# Patient Record
Sex: FEMALE | Race: WHITE | NOT HISPANIC OR LATINO | Employment: UNEMPLOYED | URBAN - METROPOLITAN AREA
[De-identification: names, ages, dates, MRNs, and addresses within clinical notes are randomized per-mention and may not be internally consistent; named-entity substitution may affect disease eponyms.]

---

## 2017-11-27 ENCOUNTER — APPOINTMENT (OUTPATIENT)
Dept: ADMISSIONS | Facility: MEDICAL CENTER | Age: 57
End: 2017-11-27
Attending: SPECIALIST
Payer: COMMERCIAL

## 2017-11-27 RX ORDER — NICOTINE 21 MG/24HR
1 PATCH, TRANSDERMAL 24 HOURS TRANSDERMAL EVERY 24 HOURS
COMMUNITY
End: 2023-05-09

## 2017-11-27 RX ORDER — LEVOTHYROXINE SODIUM 175 UG/1
175 TABLET ORAL
COMMUNITY
End: 2023-05-09

## 2017-11-27 NOTE — OR NURSING
"Preadmit appointment: \" Preparing for your Procedure information\" sheet given to patient with verbal and written instructions. Patient instructed to continue prescribed medications through the day before surgery, instructed to take the following medications the day of surgery per anesthesia protocol:Xanax if needed, Hydrocodone or Tramadol if needed, Levothyroxine  "

## 2017-12-01 ENCOUNTER — HOSPITAL ENCOUNTER (OUTPATIENT)
Facility: MEDICAL CENTER | Age: 57
End: 2017-12-01
Attending: SPECIALIST | Admitting: SPECIALIST
Payer: COMMERCIAL

## 2017-12-01 VITALS
SYSTOLIC BLOOD PRESSURE: 134 MMHG | RESPIRATION RATE: 12 BRPM | BODY MASS INDEX: 26.68 KG/M2 | HEART RATE: 92 BPM | TEMPERATURE: 97.5 F | WEIGHT: 141.31 LBS | OXYGEN SATURATION: 92 % | DIASTOLIC BLOOD PRESSURE: 89 MMHG | HEIGHT: 61 IN

## 2017-12-01 PROBLEM — Z41.1 ENCOUNTER FOR COSMETIC SURGERY: Status: ACTIVE | Noted: 2017-12-01

## 2017-12-01 PROCEDURE — 160025 RECOVERY II MINUTES (STATS): Performed by: SPECIALIST

## 2017-12-01 PROCEDURE — 700105 HCHG RX REV CODE 258: Performed by: SPECIALIST

## 2017-12-01 PROCEDURE — 501838 HCHG SUTURE GENERAL: Performed by: SPECIALIST

## 2017-12-01 PROCEDURE — 160028 HCHG SURGERY MINUTES - 1ST 30 MINS LEVEL 3: Performed by: SPECIALIST

## 2017-12-01 PROCEDURE — 502575 HCHG PACK, BREAST: Performed by: SPECIALIST

## 2017-12-01 PROCEDURE — 700111 HCHG RX REV CODE 636 W/ 250 OVERRIDE (IP)

## 2017-12-01 PROCEDURE — A9270 NON-COVERED ITEM OR SERVICE: HCPCS

## 2017-12-01 PROCEDURE — 160009 HCHG ANES TIME/MIN: Performed by: SPECIALIST

## 2017-12-01 PROCEDURE — 160035 HCHG PACU - 1ST 60 MINS PHASE I: Performed by: SPECIALIST

## 2017-12-01 PROCEDURE — 700101 HCHG RX REV CODE 250

## 2017-12-01 PROCEDURE — 160036 HCHG PACU - EA ADDL 30 MINS PHASE I: Performed by: SPECIALIST

## 2017-12-01 PROCEDURE — 160046 HCHG PACU - 1ST 60 MINS PHASE II: Performed by: SPECIALIST

## 2017-12-01 PROCEDURE — 160002 HCHG RECOVERY MINUTES (STAT): Performed by: SPECIALIST

## 2017-12-01 PROCEDURE — 160039 HCHG SURGERY MINUTES - EA ADDL 1 MIN LEVEL 3: Performed by: SPECIALIST

## 2017-12-01 PROCEDURE — 160048 HCHG OR STATISTICAL LEVEL 1-5: Performed by: SPECIALIST

## 2017-12-01 PROCEDURE — 700102 HCHG RX REV CODE 250 W/ 637 OVERRIDE(OP)

## 2017-12-01 PROCEDURE — 500122 HCHG BOVIE, BLADE: Performed by: SPECIALIST

## 2017-12-01 RX ORDER — LIDOCAINE HYDROCHLORIDE 10 MG/ML
INJECTION, SOLUTION INFILTRATION; PERINEURAL
Status: COMPLETED
Start: 2017-12-01 | End: 2017-12-01

## 2017-12-01 RX ORDER — SODIUM CHLORIDE, SODIUM LACTATE, POTASSIUM CHLORIDE, CALCIUM CHLORIDE 600; 310; 30; 20 MG/100ML; MG/100ML; MG/100ML; MG/100ML
INJECTION, SOLUTION INTRAVENOUS
Status: DISCONTINUED | OUTPATIENT
Start: 2017-12-01 | End: 2017-12-01 | Stop reason: HOSPADM

## 2017-12-01 RX ORDER — OXYCODONE HCL 5 MG/5 ML
SOLUTION, ORAL ORAL
Status: COMPLETED
Start: 2017-12-01 | End: 2017-12-01

## 2017-12-01 RX ADMIN — HYDROMORPHONE HYDROCHLORIDE 0.2 MG: 1 INJECTION, SOLUTION INTRAMUSCULAR; INTRAVENOUS; SUBCUTANEOUS at 12:51

## 2017-12-01 RX ADMIN — FENTANYL CITRATE 50 MCG: 50 INJECTION, SOLUTION INTRAMUSCULAR; INTRAVENOUS at 12:25

## 2017-12-01 RX ADMIN — OXYCODONE HYDROCHLORIDE 10 MG: 5 SOLUTION ORAL at 12:25

## 2017-12-01 RX ADMIN — SODIUM CHLORIDE, POTASSIUM CHLORIDE, SODIUM LACTATE AND CALCIUM CHLORIDE: 600; 310; 30; 20 INJECTION, SOLUTION INTRAVENOUS at 08:51

## 2017-12-01 RX ADMIN — FENTANYL CITRATE 50 MCG: 50 INJECTION, SOLUTION INTRAMUSCULAR; INTRAVENOUS at 12:35

## 2017-12-01 RX ADMIN — HYDROMORPHONE HYDROCHLORIDE 0.2 MG: 1 INJECTION, SOLUTION INTRAMUSCULAR; INTRAVENOUS; SUBCUTANEOUS at 13:07

## 2017-12-01 RX ADMIN — LIDOCAINE HYDROCHLORIDE 0.2 ML: 10 INJECTION, SOLUTION INFILTRATION; PERINEURAL at 08:45

## 2017-12-01 ASSESSMENT — PAIN SCALES - GENERAL
PAINLEVEL_OUTOF10: 5
PAINLEVEL_OUTOF10: 0
PAINLEVEL_OUTOF10: 7
PAINLEVEL_OUTOF10: 8
PAINLEVEL_OUTOF10: 7
PAINLEVEL_OUTOF10: 5
PAINLEVEL_OUTOF10: 6
PAINLEVEL_OUTOF10: 5

## 2017-12-01 NOTE — OR NURSING
1312: Rcvd report from LAURENCE Galo and assumed care, Pt sitting up in bed, pain is tolerable, no nausea, trial with room air started. Able to pull 1250 on IS, but goal is 2500 ml.  1320: More awake, asking if ride is here yet. Checked with Surgery check in and pt's boyfriend is here, just stepped out to get lunch at the cafeteria. Now able to pull 1500 ml on IS.  1335: Still sleepy, arouses to voice and spontaneously, but when sleeping, O2 saturations drop as low as 74-77%. Will come back up to 81-83% while still asleep, but will come back to >90% when aroused and asked to take a deep breath. Saturations are 92-97% after using IS.  1345: Report given back to LAURENCE Galo and she reassumed care. Pain is still tolerable, no nausea. O2 saturations are improving.

## 2017-12-01 NOTE — OR NURSING
1203 To PACU from OR via gurney, sleeping, respirations spontaneous and non-labored via OPA. VSS. Dressing to bilateral breasts c/d/i   1210 OPA dc'd at this time.  Pt drowsy but responds to verbal stimuli. VSS   1225 Pt c/o 8/10 pain. Plan to medicate at this time. VSS. Pt alert and oriented at this time.   1240 Pt states pain us coming down at this time. VSS   1250 Pt states she is having 7/10 pain. Plan to medicate. See MAR. VSS   1310 Gave report to LAURENCE Sanchez who assumed care of pt   1345 Received report from Laura, pt states pain is tolerable. Pt   1400 Pt meets criteria for stage two at this time.

## 2017-12-01 NOTE — OR SURGEON
Immediate Post OP Note    PreOp Diagnosis: ptosis    PostOp Diagnosis: same    Procedure(s):  FULL MASTOPEXY - Wound Class: Clean    Surgeon(s):  Kenji Joiner M.D.    Anesthesiologist/Type of Anesthesia:  Anesthesiologist: Lazaro Pereyra M.D.  Anesthesia Technician: Amira Lozoya/General    Surgical Staff:  Circulator: Corrine Kelsey R.N.  Relief Circulator: Trang Cheng R.N.  Scrub Person: Luz Fagan    Specimens:  * No specimens in log *    Estimated Blood Loss: 75cc    Findings:     Complications: none        12/1/2017 12:04 PM Kenji Joiner

## 2017-12-01 NOTE — OP REPORT
DATE OF SERVICE:  12/01/2017    PREOPERATIVE DIAGNOSIS:  Bilateral breast ptosis.    POSTOPERATIVE DIAGNOSIS:  Bilateral breast ptosis.    OPERATIVE PROCEDURE:  Bilateral full mastopexy.    SURGEON:  Kenji Joiner MD.    ANESTHESIOLOGIST:  Lazaro Pereyra MD.    ANESTHESIA:  General endotracheal tube.    COMPLICATIONS:  None.    ESTIMATED BLOOD LOSS:  Less than 75 mL    INDICATIONS:  Patient is a 57-year-old female who is here for bilateral full   mastopexy.  Risks and benefits of the procedure have been explained in full   including nerve damage, sensation loss, scar formation, loss of nipple areolar   complex, hematoma, seroma, wound dehiscence.  Patient is aware of the risks   and wished to proceed.    FINDINGS:  As above.    DESCRIPTION OF PROCEDURE:  The patient was preoperatively marked in the   holding room in standing position.  She was taken to operating theater where   general anesthesia was induced and 2 grams of Ancef was given prior to   starting the procedure.  Starting on the right side, I used a 38 mm template   around the nipple areolar complex and then deepithelialized, marked predrawn   marks which was a standard wise type pattern.  Once that was completed, I   developed a superior and inferior pedicle for the nipple areolar complex,   dissected medial and lateral to develop the medial and lateral flaps.    Hemostasis was obtained with electrocautery.  The pocket was irrigated with   normal saline solution.  Once that was completed, I brought the medial and   lateral flaps across the midline coming over the top of the inferior pedicle,   sutured them in place with 2-0 Vicryl sutures in the deep dermal layer.  The   nipple areolar complex was elevated into the keyhole incision and sutured in   place with 3-0 Vicryl sutures around the areola again in the deep dermal   position.  Once I had partially closed the right breast, went over to the left   side, used a 38 mm template again and did a similar  dissection with   de-epithelializing the wise type pattern, developing the superior and inferior   pedicle and then developing the medial and lateral flaps that were brought   around and sutured in place with 2-0 and 3-0 Vicryl sutures.  The patient   looked very symmetrical.  Once we were satisfied with the symmetry, we closed   the skin using 3-0 Stratafix subcuticular stitch even around the nipple   areolar complex and down the vertical incision.  A 2-0 Statafix suture was   used on the horizontal incision and of course this was a subcuticular stitch   also.  Once I was done with both the right and left sides, Steri-Strips were   placed on the incision.  She was placed in a comfort supportive bra with ABD   padding.  I wrapped her with a sure band Ace wrap for light compression.       ____________________________________     MD BREANA ROA / MAURI    DD:  12/01/2017 12:24:07  DT:  12/01/2017 13:28:46    D#:  [id number]  Job#:  [id number]

## 2017-12-01 NOTE — DISCHARGE INSTRUCTIONS
ACTIVITY: Rest and take it easy for the first 24 hours.  A responsible adult is recommended to remain with you during that time.  It is normal to feel sleepy.  We encourage you to not do anything that requires balance, judgment or coordination.    MILD FLU-LIKE SYMPTOMS ARE NORMAL. YOU MAY EXPERIENCE GENERALIZED MUSCLE ACHES, THROAT IRRITATION, HEADACHE AND/OR SOME NAUSEA.    FOR 24 HOURS DO NOT:  Drive, operate machinery or run household appliances.  Drink beer or alcoholic beverages.   Make important decisions or sign legal documents.    SPECIAL INSTRUCTIONS:  Keep dressing clean, dry, and intact.  Leave ACE wrap on for 48 hours.  Sleep/rest with head elevated at least 30 degrees (ie well propped up with pillows in bed or in recliner chair)   Begin antibiotic tomorrow 12/2/17. May shower in 48 hours     DIET: To avoid nausea, slowly advance diet as tolerated, avoiding spicy or greasy foods for the first day.  Add more substantial food to your diet according to your physician's instructions. INCREASE FLUIDS AND FIBER TO AVOID CONSTIPATION.    FOLLOW-UP APPOINTMENT:  A follow-up appointment should be arranged with your doctor ; call to schedule.    You should CALL YOUR PHYSICIAN if you develop:  Fever greater than 101 degrees F.  Pain not relieved by medication, or persistent nausea or vomiting.  Excessive bleeding (blood soaking through dressing) or unexpected drainage from the wound.  Extreme redness or swelling around the incision site, drainage of pus or foul smelling drainage.  Inability to urinate or empty your bladder within 8 hours.  Problems with breathing or chest pain.    You should call 911 if you develop problems with breathing or chest pain.  If you are unable to contact your doctor or surgical center, you should go to the nearest emergency room or urgent care center.  Physician's telephone #: Dr. Joiner [phone]    If any questions arise, call your doctor.  If your doctor is not available, please  feel free to call the Surgical Center at [phone].  The Center is open Monday through Friday from 7AM to 7PM.  You can also call the HEALTH HOTLINE open 24 hours/day, 7 days/week and speak to a nurse at [phone], or toll free at [phone].    A registered nurse may call you a few days after your surgery to see how you are doing after your procedure.    MEDICATIONS: Resume taking daily medication.  Take prescribed pain medication with food.  If no medication is prescribed, you may take non-aspirin pain medication if needed.  PAIN MEDICATION CAN BE VERY CONSTIPATING.  Take a stool softener or laxative such as senokot, pericolace, or milk of magnesia if needed.    Prescription given for Preoperatively .  Last pain medication given at 12:25 (10 mg oxycodone) .    If your physician has prescribed pain medication that includes Acetaminophen (Tylenol), do not take additional Acetaminophen (Tylenol) while taking the prescribed medication.    Depression / Suicide Risk    As you are discharged from this [facility] facility, it is important to learn how to keep safe from harming yourself.    Recognize the warning signs:  · Abrupt changes in personality, positive or negative- including increase in energy   · Giving away possessions  · Change in eating patterns- significant weight changes-  positive or negative  · Change in sleeping patterns- unable to sleep or sleeping all the time   · Unwillingness or inability to communicate  · Depression  · Unusual sadness, discouragement and loneliness  · Talk of wanting to die  · Neglect of personal appearance   · Rebelliousness- reckless behavior  · Withdrawal from people/activities they love  · Confusion- inability to concentrate     If you or a loved one observes any of these behaviors or has concerns about self-harm, here's what you can do:  · Talk about it- your feelings and reasons for harming yourself  · Remove any means that you might use to hurt yourself  (examples: pills, rope, extension cords, firearm)  · Get professional help from the community (Mental Health, Substance Abuse, psychological counseling)  · Do not be alone:Call your Safe Contact- someone whom you trust who will be there for you.  · Call your local CRISIS HOTLINE 565-0519 or 741-199-3195  · Call your local Children's Mobile Crisis Response Team [place] (409) 232-4896 or www.ishBowl  · Call the toll free National Suicide Prevention Hotlines   · National Suicide Prevention Lifeline 639-531-HLWD (4739)  · National Hope Line Network 800-SUICIDE (993-2744)

## 2017-12-01 NOTE — OR NURSING
1412: Settled in recliner post short ambulation from gurney to restroom and then to chair. Pain is tolerable, no nausea, dressing is CDI. Oxygen saturation is >90%, even while walking.  1442: O2 saturations remain 92-97% on room air. Pt not feeling SOB, dizzy, pain is still tolerable, and no nausea. D/Bruce to care of family stay in PACU 2.

## 2017-12-01 NOTE — OR NURSING
0727 pt to pre op to assume care.  0849 Patient allergies and NPO status verified, home medication reconciliation completed and belongings secured. Patient verbalizes understanding of pain scale, expected course of stay and plan of care. Surgical site verified with patient. IV access established. Sequentials placed at bedside.

## 2018-02-05 ENCOUNTER — APPOINTMENT (OUTPATIENT)
Dept: ADMISSIONS | Facility: MEDICAL CENTER | Age: 58
End: 2018-02-05
Attending: SPECIALIST
Payer: COMMERCIAL

## 2018-02-05 NOTE — OR NURSING
PreAdmit Appointment: Patient given Preparing for your procedure handout. Patient instructed to continue regularly prescribed medications through day before surgery. Instructed to take the following medications the day of surgery with a sip of water per Anesthesia protocol: Levothyroxine, Norco if needed, Xanax if needed.

## 2018-02-09 ENCOUNTER — HOSPITAL ENCOUNTER (OUTPATIENT)
Facility: MEDICAL CENTER | Age: 58
End: 2018-02-09
Attending: SPECIALIST | Admitting: SPECIALIST
Payer: COMMERCIAL

## 2018-02-09 VITALS
TEMPERATURE: 97.7 F | HEIGHT: 61 IN | BODY MASS INDEX: 27.19 KG/M2 | WEIGHT: 144 LBS | SYSTOLIC BLOOD PRESSURE: 163 MMHG | RESPIRATION RATE: 16 BRPM | OXYGEN SATURATION: 93 % | DIASTOLIC BLOOD PRESSURE: 91 MMHG

## 2018-02-09 PROBLEM — Z41.1 ENCOUNTER FOR COSMETIC PROCEDURE: Status: ACTIVE | Noted: 2018-02-09

## 2018-02-09 PROCEDURE — 160036 HCHG PACU - EA ADDL 30 MINS PHASE I: Performed by: SPECIALIST

## 2018-02-09 PROCEDURE — 500817 HCHG MAMMARY SIZER: Performed by: SPECIALIST

## 2018-02-09 PROCEDURE — 160009 HCHG ANES TIME/MIN: Performed by: SPECIALIST

## 2018-02-09 PROCEDURE — 700111 HCHG RX REV CODE 636 W/ 250 OVERRIDE (IP)

## 2018-02-09 PROCEDURE — 160025 RECOVERY II MINUTES (STATS): Performed by: SPECIALIST

## 2018-02-09 PROCEDURE — 160048 HCHG OR STATISTICAL LEVEL 1-5: Performed by: SPECIALIST

## 2018-02-09 PROCEDURE — 501445 HCHG STAPLER, SKIN DISP: Performed by: SPECIALIST

## 2018-02-09 PROCEDURE — 700102 HCHG RX REV CODE 250 W/ 637 OVERRIDE(OP)

## 2018-02-09 PROCEDURE — 160039 HCHG SURGERY MINUTES - EA ADDL 1 MIN LEVEL 3: Performed by: SPECIALIST

## 2018-02-09 PROCEDURE — 160002 HCHG RECOVERY MINUTES (STAT): Performed by: SPECIALIST

## 2018-02-09 PROCEDURE — 502575 HCHG PACK, BREAST: Performed by: SPECIALIST

## 2018-02-09 PROCEDURE — 160035 HCHG PACU - 1ST 60 MINS PHASE I: Performed by: SPECIALIST

## 2018-02-09 PROCEDURE — 501838 HCHG SUTURE GENERAL: Performed by: SPECIALIST

## 2018-02-09 PROCEDURE — A9270 NON-COVERED ITEM OR SERVICE: HCPCS

## 2018-02-09 PROCEDURE — 700101 HCHG RX REV CODE 250

## 2018-02-09 PROCEDURE — 500440 HCHG DRESSING, STERILE ROLL (KERLIX): Performed by: SPECIALIST

## 2018-02-09 PROCEDURE — A6410 STERILE EYE PAD: HCPCS | Performed by: SPECIALIST

## 2018-02-09 PROCEDURE — 700105 HCHG RX REV CODE 258: Performed by: SPECIALIST

## 2018-02-09 PROCEDURE — 160046 HCHG PACU - 1ST 60 MINS PHASE II: Performed by: SPECIALIST

## 2018-02-09 PROCEDURE — 160028 HCHG SURGERY MINUTES - 1ST 30 MINS LEVEL 3: Performed by: SPECIALIST

## 2018-02-09 PROCEDURE — 500128 HCHG BOVIE, NEEDLE TIP 3CM: Performed by: SPECIALIST

## 2018-02-09 PROCEDURE — 500122 HCHG BOVIE, BLADE: Performed by: SPECIALIST

## 2018-02-09 DEVICE — IMPLANTABLE DEVICE: Type: IMPLANTABLE DEVICE | Site: BREAST | Status: FUNCTIONAL

## 2018-02-09 RX ORDER — BUPIVACAINE HYDROCHLORIDE AND EPINEPHRINE 2.5; 5 MG/ML; UG/ML
INJECTION, SOLUTION EPIDURAL; INFILTRATION; INTRACAUDAL; PERINEURAL
Status: DISCONTINUED | OUTPATIENT
Start: 2018-02-09 | End: 2018-02-09 | Stop reason: HOSPADM

## 2018-02-09 RX ORDER — GENTAMICIN SULFATE 40 MG/ML
INJECTION, SOLUTION INTRAMUSCULAR; INTRAVENOUS
Status: DISCONTINUED | OUTPATIENT
Start: 2018-02-09 | End: 2018-02-09 | Stop reason: HOSPADM

## 2018-02-09 RX ORDER — BACITRACIN 50000 [IU]/1
INJECTION, POWDER, FOR SOLUTION INTRAMUSCULAR
Status: DISCONTINUED | OUTPATIENT
Start: 2018-02-09 | End: 2018-02-09 | Stop reason: HOSPADM

## 2018-02-09 RX ORDER — HYDROMORPHONE HYDROCHLORIDE 2 MG/ML
INJECTION, SOLUTION INTRAMUSCULAR; INTRAVENOUS; SUBCUTANEOUS
Status: COMPLETED
Start: 2018-02-09 | End: 2018-02-09

## 2018-02-09 RX ORDER — OXYCODONE HCL 5 MG/5 ML
SOLUTION, ORAL ORAL
Status: COMPLETED
Start: 2018-02-09 | End: 2018-02-09

## 2018-02-09 RX ORDER — LIDOCAINE HYDROCHLORIDE AND EPINEPHRINE 10; 10 MG/ML; UG/ML
INJECTION, SOLUTION INFILTRATION; PERINEURAL
Status: DISCONTINUED | OUTPATIENT
Start: 2018-02-09 | End: 2018-02-09 | Stop reason: HOSPADM

## 2018-02-09 RX ORDER — SODIUM CHLORIDE, SODIUM LACTATE, POTASSIUM CHLORIDE, CALCIUM CHLORIDE 600; 310; 30; 20 MG/100ML; MG/100ML; MG/100ML; MG/100ML
INJECTION, SOLUTION INTRAVENOUS CONTINUOUS
Status: DISCONTINUED | OUTPATIENT
Start: 2018-02-09 | End: 2018-02-12 | Stop reason: HOSPADM

## 2018-02-09 RX ADMIN — SODIUM CHLORIDE, POTASSIUM CHLORIDE, SODIUM LACTATE AND CALCIUM CHLORIDE: 600; 310; 30; 20 INJECTION, SOLUTION INTRAVENOUS at 11:05

## 2018-02-09 RX ADMIN — HYDROMORPHONE HYDROCHLORIDE 0.5 MG: 2 INJECTION, SOLUTION INTRAMUSCULAR; INTRAVENOUS; SUBCUTANEOUS at 16:14

## 2018-02-09 RX ADMIN — FENTANYL CITRATE 50 MCG: 50 INJECTION, SOLUTION INTRAMUSCULAR; INTRAVENOUS at 15:46

## 2018-02-09 RX ADMIN — OXYCODONE HYDROCHLORIDE 10 MG: 5 SOLUTION ORAL at 15:40

## 2018-02-09 RX ADMIN — FENTANYL CITRATE 50 MCG: 50 INJECTION, SOLUTION INTRAMUSCULAR; INTRAVENOUS at 15:30

## 2018-02-09 ASSESSMENT — PAIN SCALES - GENERAL
PAINLEVEL_OUTOF10: 8
PAINLEVEL_OUTOF10: 0
PAINLEVEL_OUTOF10: 8
PAINLEVEL_OUTOF10: 6
PAINLEVEL_OUTOF10: 5
PAINLEVEL_OUTOF10: 8
PAINLEVEL_OUTOF10: 6
PAINLEVEL_OUTOF10: 8
PAINLEVEL_OUTOF10: 6
PAINLEVEL_OUTOF10: 6

## 2018-02-09 NOTE — OR NURSING
"1513 Patient to recovery via cart. Placed on monitors. Oral airway remains in place. Spontaneous respirations. Lung sounds clear.   1515 Oral airway removed. Patient awake. States she has to \"pee\".   1520 Patient placed on bedpan.  1525 Patient complains of pain.  1530 Patient medicated intravenously for pain.  1540 Patent medicated orally for pain. Patient urinated a copious amount of urine. Sheets changed. Warm blankets given. Patient sipping on water. Ice pack applied to patients eyes.  1551 Patients friend notified of disposition.  1556 Report to Laura RN to assume care of patient.  "

## 2018-02-09 NOTE — OR NURSING
Pt to pre-op. Name, birthday, allergies, NPO status, last void, medication rec, surgical site and procedure verified with patient.  Pt belongings collected and secured in locker.  Pt verbalizes understanding of pain scale, expected plan of care and course of stay.  IV established. Sequentials placed.

## 2018-02-09 NOTE — OR SURGEON
Immediate Post OP Note    PreOp Diagnosis: lower lid fat herniation, breast ptosis    PostOp Diagnosis: same    Procedure(s):  BLEPHAROPLASTY - LOWER - Wound Class: Clean  MAMMOPLASTY AUGMENTATION - Wound Class: Clean    Surgeon(s):  Kenji Joiner M.D.    Anesthesiologist/Type of Anesthesia:  Anesthesiologist: Lazaro Pereyra M.D./General    Surgical Staff:  Circulator: Milka Munroe R.N.  Relief Circulator: Trang Cheng R.N.  Scrub Person: Luz Fagan    Specimens:  * No specimens in log *    Estimated Blood Loss: 100cc    Findings:     Complications: none        2/9/2018 3:16 PM Kenji Joiner

## 2018-02-10 NOTE — OP REPORT
DATE OF SERVICE:  02/09/2018    PREOPERATIVE DIAGNOSES:  1.  Bilateral lower lid fat herniation (severe).  2.  Bilateral breast ptosis, status post full mastopexy, patient now here for   breast augmentation.    POSTOPERATIVE DIAGNOSES:  1.  Bilateral lower lid fat herniation (severe).  2.  Bilateral breast ptosis, status post full mastopexy, patient now here for   breast augmentation.    OPERATIVE PROCEDURE:  1.  Bilateral lower lid blepharoplasty.  2.  Bilateral breast augmentation (Allergan SRX gel implants 750 mL   bilateral).    SURGEON:  Kenji Joiner MD    ANESTHESIOLOGIST:  Dr. Pereyra.    ANESTHESIA:  General endotracheal tube.    COMPLICATIONS:  None.    ESTIMATED BLOOD LOSS:  100 mL    INDICATIONS:  The patient is a 56-year-old female who desires bilateral breast   augmentation and lower lid blepharoplasty.  Risks and benefits of the   procedure have been explained in full including infection, bleeding,   capsulation, deflation, asymmetries, nerve damage, rippling on sides, possible   decreased cancer detection.  In regards to lower lid blepharoplasty we   discussed the issues such as scar formation, ectropion information, scleral   show, retained wrinkles of the lower, over resection, under resection.  The   patient understands the risks and wished to proceed.    FINDINGS:  As above.    DETAILS OF PROCEDURE:  The patient was preoperatively marked in the holding   room in standing position.  I actually marked her eyes when she was sitting.    Once she was marked, the patient was taken to operating room.  General   anesthesia was induced.  A 2 g of Ancef were given prior to starting the   procedure.  Starting with the blepharoplasty, patient was prepped and draped   and I used 1% lidocaine with epinephrine in the lower lid for the hemostatic   effect, I made a subciliary incision with a 15 blade, standard hockey stick   type incision, dissected down through the tissue developing a skin muscle   flap,  identified the septal tissue with the herniated fat behind it, opened up   all 3 compartments, including the lateral, middle and medial fat pads.  They   were teased out and removed with electrocautery.  Electrocautery was used to   controlling any bleeding.  I had initially placed a temporary tarsorrhaphy   stitch that was taken out and then I resected approximately 1-2 mm of lower   lid skin and a triangle of skin out laterally, I was conservative on how much   skin I removed just because the patient has a moderate snap back test.  Once I   had completed removing the lower lid skin.  Cautery was used for hemostasis.    I used a 6-0 Vicryl suture and reapproximated the orbicularis muscle out   laterally for strength and then closed the skin with a 6-0 silk suture in   interrupted fashion, went over to the left side, I did a similar dissection   removing the medial, middle and lateral fat pads.  I did keep the fat to make   sure I remove the same amount on both sides.  Then, I resected 1-2 mm of lower   lid skin and resuspended the orbicularis muscle like I did on the right side.    Once her eyelids were closed, washed out her eyes with BSS solution and we   then undraped and reprepped for the breast augmentation.    The patient had already undergone a previous full mastopexy.  Once she was   prepped and draped, I used 10 mL of 0.25% Marcaine with epinephrine along the   lateral incision and in the pectoralis major muscle up high by the armpit.    Starting on the right side, I made an incision through her old lateral   incision, dissected down to the breast tissue, identified the lateral edge of   the pectoralis major muscle, elevated it, made a submuscular pocket.    Hemostasis was obtained with electrocautery.  The pocket was irrigated with   normal saline solution with triple antibiotic added.  I then placed a saline   sizer on this right side and filled it up to 750 mL  Pocket adjustments were   made.  Once I  was satisfied, went over to left side, did similar submuscular   dissection and placed another 750 mL saline sizer, the patient was sat up.  I   was pleased the symmetry.  Once I have done a little bit of pocket   adjustments, patient was laid down.  The saline implants were removed.  The   pocket was irrigated again and I injected 20 mL of 0.25% Marcaine with   epinephrine into the pectoralis major muscle for postoperative pain control.    We then chose to open the Allergan SRX gel implants.  There were 750 mL, I   placed an implant on the right side through a Ren funnel into the   submuscular position.  This was similarly done on the left side.  Once we had   both implants in the submuscular position and the patient was sat up once   again, nature of the symmetry was good, she was laid back down and I closed   the deep layers with 3-0 Vicryl sutures, close the skin with a 3-0 Stratafix   subcuticular stitch, double layer closure.  Steri-Strips were applied, 2-inch   foam tape was placed around the breasts and she was placed in a comfort   supportive bra.  I wrapped her with a sure band, Ace wrap for compression.    She was returned to the PACU in stable condition.       ____________________________________     MD BREANA ROA / MAURI    DD:  02/09/2018 18:27:44  DT:  02/09/2018 20:17:54    D#:  [id number]  Job#:  [id number]

## 2018-02-10 NOTE — OR NURSING
1555: Rcvd report from LAURENCE Matos and [facility] care. Pt resting, pain medications just given, no nausea.  1600: Sitting up in the gurney more, arms propped up on pillows. Pain is currently tolerable, no nausea.  1615: Pain increased, pain medication given per MAR, no nausea.  1630: Pain is tolerable, awake and alert, no nausea, going to trial room air. Able to pull 1000 ml on IS, goal is 1820 ml.  1640: Tolerating room air, pt more awake, able to pull 1250 ml on IS. Pain is tolerable, no nausea.  1645: Pain is still tolerable, no nausea, tolerating room air. Meets criteria to transfer to Stage 2, report called to LAURENCE Hayden and pt readied for transfer.

## 2018-02-10 NOTE — DISCHARGE INSTRUCTIONS
ACTIVITY: Rest and take it easy for the first 24 hours.  A responsible adult is recommended to remain with you during that time.  It is normal to feel sleepy.  We encourage you to not do anything that requires balance, judgment or coordination.    MILD FLU-LIKE SYMPTOMS ARE NORMAL. YOU MAY EXPERIENCE GENERALIZED MUSCLE ACHES, THROAT IRRITATION, HEADACHE AND/OR SOME NAUSEA.    FOR 24 HOURS DO NOT:  Drive, operate machinery or run household appliances.  Drink beer or alcoholic beverages.   Make important decisions or sign legal documents.    SPECIAL INSTRUCTIONS: ICE PACK TO EYES INTERMITTENTLY. ELEVATE HOB 45 DEGREES. START ORAL ANTIBIOTICS 8 HOURS AFTER FIRST DOSE. APPROXIMATELY 8:00 PM.    DIET: To avoid nausea, slowly advance diet as tolerated, avoiding spicy or greasy foods for the first day.  Add more substantial food to your diet according to your physician's instructions.  Babies can be fed formula or breast milk as soon as they are hungry.  INCREASE FLUIDS AND FIBER TO AVOID CONSTIPATION.    SURGICAL DRESSING/BATHING: DO NOT REMOVE DRESSINGS FOR 2 DAYS. MAY SHOWER IN 48 HOURS.     FOLLOW-UP APPOINTMENT:  A follow-up appointment should be arranged with your doctor; call to schedule.    You should CALL YOUR PHYSICIAN if you develop:  Fever greater than 101 degrees F.  Pain not relieved by medication, or persistent nausea or vomiting.  Excessive bleeding (blood soaking through dressing) or unexpected drainage from the wound.  Extreme redness or swelling around the incision site, drainage of pus or foul smelling drainage.  Inability to urinate or empty your bladder within 8 hours.  Problems with breathing or chest pain.    You should call 911 if you develop problems with breathing or chest pain.  If you are unable to contact your doctor or surgical center, you should go to the nearest emergency room or urgent care center.  Physician's telephone #: [phone].    If any questions arise, call your doctor.  If your  doctor is not available, please feel free to call the Surgical Center at [phone].  The Center is open Monday through Friday from 7AM to 7PM.  You can also call the HEALTH HOTLINE open 24 hours/day, 7 days/week and speak to a nurse at [phone], or toll free at [phone].    A registered nurse may call you a few days after your surgery to see how you are doing after your procedure.    MEDICATIONS: Resume taking daily medication.  Take prescribed pain medication with food.  If no medication is prescribed, you may take non-aspirin pain medication if needed.  PAIN MEDICATION CAN BE VERY CONSTIPATING.  Take a stool softener or laxative such as senokot, pericolace, or milk of magnesia if needed.    Prescription given for ANTIBIOTICS PRIOR TO SURGERY.  Last pain medication given at 03:40 PM..    If your physician has prescribed pain medication that includes Acetaminophen (Tylenol), do not take additional Acetaminophen (Tylenol) while taking the prescribed medication.    Depression / Suicide Risk    As you are discharged from this [facility] facility, it is important to learn how to keep safe from harming yourself.    Recognize the warning signs:  · Abrupt changes in personality, positive or negative- including increase in energy   · Giving away possessions  · Change in eating patterns- significant weight changes-  positive or negative  · Change in sleeping patterns- unable to sleep or sleeping all the time   · Unwillingness or inability to communicate  · Depression  · Unusual sadness, discouragement and loneliness  · Talk of wanting to die  · Neglect of personal appearance   · Rebelliousness- reckless behavior  · Withdrawal from people/activities they love  · Confusion- inability to concentrate     If you or a loved one observes any of these behaviors or has concerns about self-harm, here's what you can do:  · Talk about it- your feelings and reasons for harming yourself  · Remove any means that you  might use to hurt yourself (examples: pills, rope, extension cords, firearm)  · Get professional help from the community (Mental Health, Substance Abuse, psychological counseling)  · Do not be alone:Call your Safe Contact- someone whom you trust who will be there for you.  · Call your local CRISIS HOTLINE 302-7590 or 721-641-3216  · Call your local Children's Mobile Crisis Response Team [place] (270) 944-9245 or www.[employer]Hound  · Call the toll free National Suicide Prevention Hotlines   · National Suicide Prevention Lifeline 061-448-EUAP (5366)  · National Hope Line Network 800-SUICIDE (247-4581)

## 2018-02-10 NOTE — OR NURSING
1655 Pt received in PACU2. Report received from RN. Pt dressed and transferred to recliner. VS taken per doc flow. Water and  warm blanket provided. 5/10 pain tolerable. Denies nausea. Dressings cdi. Even nonlabored breathing. Awake and alert. 1L O2 NC placed  1720 pt maintains 93% on RA post up to bathroom and ambulation with RN DEREKA.   1730Pt discharged via wheelchair escorted by family and RN. Left with all personal possessions and discharge instructions. Pt and family verbalized understanding of instructions.  PIV removed. Even and non labored breathing. No signs of distress noted.

## 2018-02-11 ENCOUNTER — PATIENT OUTREACH (OUTPATIENT)
Dept: HEALTH INFORMATION MANAGEMENT | Facility: OTHER | Age: 58
End: 2018-02-11

## 2018-02-11 NOTE — PROGRESS NOTES
2/11/18 at 8:48 AM--Received incoming VM from pt at 8:38 AM.  Placed phone call to patient s/p hospital discharge 2/9/18.  Pt is inquiring about care of sutures around her eyes.  She states that she is going to take a shower today and doesn't know if she can get the area wet.  Instructed pt to keep sutures as dry as possible during her shower.  Pt states she will call her surgeon's office tomorrow to schedule f/u appt.  Instructed pt to inquire about wound care around eyes tomorrow when she contacts her surgeon's office, and to keep area dry today.  Pt verbalizes understanding and states she has no further questions or concerns at this time.

## 2019-08-03 ENCOUNTER — OFFICE VISIT (OUTPATIENT)
Dept: URGENT CARE | Facility: PHYSICIAN GROUP | Age: 59
End: 2019-08-03
Payer: MEDICAID

## 2019-08-03 VITALS
BODY MASS INDEX: 31.72 KG/M2 | OXYGEN SATURATION: 94 % | HEART RATE: 72 BPM | HEIGHT: 61 IN | WEIGHT: 168 LBS | TEMPERATURE: 97.5 F | DIASTOLIC BLOOD PRESSURE: 62 MMHG | RESPIRATION RATE: 16 BRPM | SYSTOLIC BLOOD PRESSURE: 116 MMHG

## 2019-08-03 DIAGNOSIS — B02.9 HERPES ZOSTER WITHOUT COMPLICATION: ICD-10-CM

## 2019-08-03 PROCEDURE — 99203 OFFICE O/P NEW LOW 30 MIN: CPT | Performed by: PHYSICIAN ASSISTANT

## 2019-08-03 RX ORDER — VALACYCLOVIR HYDROCHLORIDE 1 G/1
1000 TABLET, FILM COATED ORAL 3 TIMES DAILY
Qty: 21 TAB | Refills: 0 | Status: SHIPPED | OUTPATIENT
Start: 2019-08-03 | End: 2019-08-10

## 2019-08-04 ASSESSMENT — ENCOUNTER SYMPTOMS
FEVER: 0
EYE DISCHARGE: 0
DIZZINESS: 0
EYE REDNESS: 0
MYALGIAS: 1
CHILLS: 1

## 2019-08-04 NOTE — PROGRESS NOTES
"Subjective:      Adrianna Khan is a 58 y.o. female who presents with Herpes Zoster            Rash   This is a new problem. Episode onset: 2 days ago. The problem has been gradually worsening since onset. The affected locations include the scalp. The rash is characterized by blistering, burning, pain and redness. She was exposed to nothing. Pertinent negatives include no fever. (Reports chills and bodyaches  ) Past treatments include nothing. Her past medical history is significant for varicella.       Review of Systems   Constitutional: Positive for chills and malaise/fatigue. Negative for fever.   Eyes: Negative for discharge and redness.   Musculoskeletal: Positive for myalgias.   Skin: Positive for itching and rash.   Neurological: Negative for dizziness.   All other systems reviewed and are negative.         Objective:     /62 (BP Location: Right arm, Patient Position: Sitting, BP Cuff Size: Adult)   Pulse 72   Temp 36.4 °C (97.5 °F) (Temporal)   Resp 16   Ht 1.549 m (5' 1\")   Wt 76.2 kg (168 lb)   SpO2 94%   BMI 31.74 kg/m²      PMH:  has a past medical history of Anxiety (2/13/2014), Arthritis, Asthma, Breath shortness, Cancer (CMS-HCC) (HCC) (1988), Chronic low back pain (2/13/2014), Chronic neck pain (2/13/2014), Depression (2/13/2014), Dyslipidemia (9/19/2014), Fibromyalgia (2/13/2014), Graves disease, Heart burn, Hiatus hernia syndrome, History of positive PPD, Hypothyroid (2/13/2014), Insomnia (2/13/2014), Left knee pain (9/19/2014), and Pneumonia (2014).  MEDS:   Current Outpatient Medications:   •  valacyclovir (VALTREX) 1 GM Tab, Take 1 Tab by mouth 3 times a day for 7 days., Disp: 21 Tab, Rfl: 0  •  levothyroxine (SYNTHROID) 175 MCG Tab, Take 175 mcg by mouth Every morning on an empty stomach., Disp: , Rfl:   •  tramadol (ULTRAM) 50 MG Tab, Take 1-2 Tabs by mouth every 6 hours as needed for Moderate Pain., Disp: 15 Tab, Rfl: 0  •  alprazolam (XANAX) 1 MG Tab, Take 1 Tab by mouth 2 " "times a day as needed for Anxiety. Take 1 tab by mouth every day as needed for anxiety. The earliest date the the pharmacy may fill the rx is 4/13/2016., Disp: 50 Tab, Rfl: 0  •  hydrocodone/acetaminophen (NORCO)  MG Tab, Take 1 tablet by mouth every 4 to 6 hours as need for pain. Max 5 tablets per day. One month supply. (Patient taking differently: every four hours as needed. Take 1 tablet by mouth every 4 to 6 hours as need for pain. Max 5 tablets per day. One month supply.), Disp: 150 Tab, Rfl: 0  •  duloxetine (CYMBALTA) 60 MG CPEP delayed-release capsule, Take 1 Cap by mouth every day., Disp: 60 Cap, Rfl: 0  •  nicotine (NICODERM) 21 MG/24HR PATCH 24 HR, Apply 1 Patch to skin as directed every 24 hours., Disp: , Rfl:   ALLERGIES:   Allergies   Allergen Reactions   • Nubain [Nalbuphine] Itching     \"severe itching\"   • Iodine      Blisters, topical \"no issues with IV contrast\"   • Methadone Hcl Vomiting   • Morphine Vomiting   • Pcn [Penicillins] Rash     .   • Prednisone Rash     .     SURGHX:   Past Surgical History:   Procedure Laterality Date   • BLEPHAROPLASTY Bilateral 2/9/2018    Procedure: BLEPHAROPLASTY - LOWER;  Surgeon: Kenji Joiner M.D.;  Location: [facility];  Service: Plastics   • MAMMOPLASTY AUGMENTATION Bilateral 2/9/2018    Procedure: MAMMOPLASTY AUGMENTATION;  Surgeon: Kenji Joiner M.D.;  Location: [facility];  Service: Plastics   • MASTOPEXY Bilateral 12/1/2017    Procedure: MASTOPEXY;  Surgeon: Kenji Joiner M.D.;  Location: [facility];  Service: Plastics   • KNEE ARTHROSCOPY Right 02/2016   • KNEE ARTHROPLASTY TOTAL Left 11/17/2015    left total knee replacement 11/2015   • HERNIA REPAIR  2008   • GYN SURGERY  1991   • ABDOMINAL HYSTERECTOMY TOTAL  1988    total   • CHOLECYSTECTOMY  1987    open   • EXPLORATORY LAPAROTOMY  1981    tubal pregnancy   • APPENDECTOMY  1980     SOCHX:  reports that she has been smoking cigarettes. She " has a 20.00 pack-year smoking history. She has never used smokeless tobacco. She reports that she has current or past drug history. Drug: Oral. She reports that she does not drink alcohol.  FH: Family history was reviewed, no pertinent findings to report    Physical Exam   Constitutional: She is oriented to person, place, and time. She appears well-developed and well-nourished. No distress.   HENT:   Head: Normocephalic and atraumatic.   Right Ear: External ear normal.   Left Ear: External ear normal.   Mouth/Throat: Oropharynx is clear and moist. No oropharyngeal exudate.   Eyes: Pupils are equal, round, and reactive to light. Conjunctivae and EOM are normal.   Neck: Normal range of motion. Neck supple. No tracheal deviation present.   Cardiovascular: Normal rate and regular rhythm.   No murmur heard.  Pulmonary/Chest: Effort normal and breath sounds normal. No respiratory distress.   Musculoskeletal: Normal range of motion. She exhibits no edema.   Lymphadenopathy:     She has cervical adenopathy.   Neurological: She is alert and oriented to person, place, and time. Coordination normal.   Skin: Skin is warm. Rash noted. Rash is vesicular.        Psychiatric: She has a normal mood and affect. Her behavior is normal. Judgment and thought content normal.   Vitals reviewed.              Assessment/Plan:     1. Herpes zoster without complication  - valacyclovir (VALTREX) 1 GM Tab; Take 1 Tab by mouth 3 times a day for 7 days.  Dispense: 21 Tab; Refill: 0    Symptoms started 2 days ago- will start her on Valtrex.  Discussed the contagious nature of symptoms today.Also discussed complications of zoster as well.   Patient given precautionary s/sx that mandate immediate follow up and evaluation in the ED. Advised of risks of not doing so.    DDX, Supportive care, and indications for immediate follow-up discussed with patient.    Instructed to return to clinic or nearest emergency department if we are not available for any  change in condition, further concerns, or worsening of symptoms.    The patient demonstrated a good understanding and agreed with the treatment plan.    Please note that this dictation was created using voice recognition software. I have made every reasonable attempt to correct obvious errors, but I expect that there are errors of grammar and possibly content that I did not discover before finalizing the note.

## 2020-10-08 ENCOUNTER — OFFICE VISIT (OUTPATIENT)
Dept: URGENT CARE | Facility: PHYSICIAN GROUP | Age: 60
End: 2020-10-08
Payer: MEDICAID

## 2020-10-08 VITALS
OXYGEN SATURATION: 95 % | HEIGHT: 61 IN | DIASTOLIC BLOOD PRESSURE: 82 MMHG | WEIGHT: 191 LBS | RESPIRATION RATE: 16 BRPM | HEART RATE: 96 BPM | TEMPERATURE: 97.4 F | SYSTOLIC BLOOD PRESSURE: 116 MMHG | BODY MASS INDEX: 36.06 KG/M2

## 2020-10-08 DIAGNOSIS — K29.00 ACUTE GASTRITIS WITHOUT HEMORRHAGE, UNSPECIFIED GASTRITIS TYPE: ICD-10-CM

## 2020-10-08 PROCEDURE — 99214 OFFICE O/P EST MOD 30 MIN: CPT | Performed by: PHYSICIAN ASSISTANT

## 2020-10-08 RX ORDER — MIRTAZAPINE 30 MG/1
TABLET, FILM COATED ORAL
COMMUNITY
Start: 2020-09-28 | End: 2023-05-09

## 2020-10-08 RX ORDER — ALBUTEROL SULFATE 90 MCG
HFA AEROSOL WITH ADAPTER (GRAM) INHALATION
COMMUNITY
Start: 2020-09-28 | End: 2023-05-09 | Stop reason: SDUPTHER

## 2020-10-08 RX ORDER — CLONAZEPAM 1 MG/1
TABLET ORAL
COMMUNITY
Start: 2020-10-02 | End: 2023-05-09

## 2020-10-08 RX ORDER — MORPHINE SULFATE 30 MG/1
TABLET, FILM COATED, EXTENDED RELEASE ORAL
COMMUNITY
Start: 2020-10-02 | End: 2023-05-09

## 2020-10-08 RX ORDER — TOPIRAMATE 50 MG/1
TABLET, FILM COATED ORAL
COMMUNITY
Start: 2020-10-02 | End: 2023-05-09

## 2020-10-08 RX ORDER — LIRAGLUTIDE 6 MG/ML
INJECTION SUBCUTANEOUS
COMMUNITY
Start: 2020-09-29 | End: 2023-05-09

## 2020-10-08 RX ORDER — LEVOTHYROXINE SODIUM 0.2 MG/1
TABLET ORAL DAILY
COMMUNITY
Start: 2020-09-18 | End: 2023-10-04 | Stop reason: SDUPTHER

## 2020-10-08 RX ORDER — OMEPRAZOLE 20 MG/1
20 CAPSULE, DELAYED RELEASE ORAL DAILY
Qty: 30 CAP | Refills: 0 | Status: SHIPPED | OUTPATIENT
Start: 2020-10-08 | End: 2023-05-09

## 2020-10-08 RX ORDER — PEN NEEDLE, DIABETIC 32GX 5/32"
NEEDLE, DISPOSABLE MISCELLANEOUS
COMMUNITY
Start: 2020-09-03

## 2020-10-08 RX ORDER — MIRTAZAPINE 15 MG/1
TABLET, FILM COATED ORAL
COMMUNITY
Start: 2020-09-28 | End: 2023-05-09

## 2020-10-08 RX ORDER — TOPIRAMATE 25 MG/1
TABLET ORAL 4 TIMES DAILY
COMMUNITY
Start: 2020-09-03 | End: 2023-05-09

## 2020-10-08 NOTE — PROGRESS NOTES
Chief Complaint   Patient presents with   • Gastrophageal Reflux       HISTORY OF PRESENT ILLNESS: Patient is a 59 y.o. female who presents today because reports that she had some type of stomach bug about a week ago and she is over the nausea that it produced but she is having nausea secondary to acid reflux and heartburn symptoms.  She tried some over-the-counter Pepcid and did not help.  She does have an appointment with her primary care provider in 1 week.  Denies any bloody stools or dark stools    Patient Active Problem List    Diagnosis Date Noted   • Encounter for cosmetic surgery 12/01/2017     Priority: High   • Encounter for cosmetic procedure 02/09/2018   • Pain medication agreement discussed 03/25/2016   • Dyslipidemia 09/19/2014   • Left knee pain 09/19/2014   • RUQ pain 03/14/2014   • Hypothyroid 02/13/2014   • Depression 02/13/2014   • Fibromyalgia 02/13/2014   • Chronic low back pain 02/13/2014   • Chronic neck pain 02/13/2014   • Insomnia 02/13/2014   • Anxiety 02/13/2014   • H/O abdominal surgery 02/13/2014       Allergies:Nubain [nalbuphine], Iodine, Methadone hcl, Morphine, Pcn [penicillins], and Prednisone    Current Outpatient Medications Ordered in Epic   Medication Sig Dispense Refill   • topiramate (TOPAMAX) 50 MG tablet      • morphine ER (MS CONTIN) 30 MG Tab CR tablet TK 1 T PO  Q 12 H FOR 30 DAYS.     • mirtazapine (REMERON) 30 MG Tab tablet Take  by mouth. Take 1 tablet by mouth every night at bedtime     • mirtazapine (REMERON) 15 MG Tab TK 1 T PO  QHS. TAKE WITH 30 MG FOR A TOTAL 45 MG.     • VICTOZA 18 MG/3ML Solution Pen-injector      • levothyroxine (SYNTHROID) 200 MCG Tab Take  by mouth every day. Take 1 tablet by mouth every day     • PROVENTIL  (90 Base) MCG/ACT Aero Soln inhalation aerosol INL 2 PFS PO QID PRN     • clonazePAM (KLONOPIN) 1 MG Tab      • omeprazole (PRILOSEC) 20 MG delayed-release capsule Take 1 Cap by mouth every day. 30 Cap 0   •  "hydrocodone/acetaminophen (NORCO)  MG Tab Take 1 tablet by mouth every 4 to 6 hours as need for pain. Max 5 tablets per day. One month supply. (Patient taking differently: every four hours as needed. Take 1 tablet by mouth every 4 to 6 hours as need for pain. Max 5 tablets per day. One month supply.) 150 Tab 0   • duloxetine (CYMBALTA) 60 MG CPEP delayed-release capsule Take 1 Cap by mouth every day. 60 Cap 0   • topiramate (TOPAMAX) 25 MG Tab Take  by mouth 4 times a day. Take 1 tablet by mouth four times a day     • BD PEN NEEDLE ADELFO U/F USE 1 NEEDLE ONCE DAILY FOR 30 DAYS     • levothyroxine (SYNTHROID) 175 MCG Tab Take 175 mcg by mouth Every morning on an empty stomach.     • nicotine (NICODERM) 21 MG/24HR PATCH 24 HR Apply 1 Patch to skin as directed every 24 hours.     • tramadol (ULTRAM) 50 MG Tab Take 1-2 Tabs by mouth every 6 hours as needed for Moderate Pain. (Patient not taking: Reported on 10/8/2020) 15 Tab 0   • alprazolam (XANAX) 1 MG Tab Take 1 Tab by mouth 2 times a day as needed for Anxiety. Take 1 tab by mouth every day as needed for anxiety. The earliest date the the pharmacy may fill the rx is 4/13/2016. (Patient not taking: Reported on 10/8/2020) 50 Tab 0     No current Epic-ordered facility-administered medications on file.        Past Medical History:   Diagnosis Date   • Anxiety 2/13/2014    Xanax/ativan prn, switches off   • Arthritis     Neck, hands, low back, knees   • Asthma     inhalers daily    • Breath shortness     \"at times\"   • Cancer (HCC) 1988    Uterine CA- hysterectomy   • Chronic low back pain 2/13/2014    Due to working with horses Hc/apap    • Chronic neck pain 2/13/2014    Years hc/apap   • Depression 2/13/2014    cymbalta    • Dyslipidemia 9/19/2014    Has tricor, hasn't started it - afraid it would interfere with her thyroid meds Will check tft then start tricor - rpeta tft x 3 months   • Fibromyalgia 2/13/2014    dx'd around 2005 cymbalta   • Graves disease    • " "Heart burn    • Hiatus hernia syndrome     Surgically repaired   • History of positive PPD    • Hypothyroid 2/13/2014    meds since around 2000   • Insomnia 2/13/2014    Amitriptyline - helps but wt gain Xanax/ativan for sleep too as needed   • Left knee pain 9/19/2014    Already has an appt with dr alston in [place] at the end of hte month - just needs a referral   • Pneumonia 2014       Social History     Tobacco Use   • Smoking status: Current Some Day Smoker     Packs/day: 0.50     Years: 40.00     Pack years: 20.00     Types: Cigarettes   • Smokeless tobacco: Never Used   Substance Use Topics   • Alcohol use: No   • Drug use: Yes     Types: Oral     Comment: denies h/o heroin, cocaine, meth, IVDU, Denies current use (uses oral medical marijuana)       Family Status   Relation Name Status   • Mo  Alive   • Fa  Alive   • MAunt  (Not Specified)   • Neg Hx  (Not Specified)     Family History   Problem Relation Age of Onset   • Cancer Maternal Aunt         breast   • Diabetes Neg Hx    • Heart Disease Neg Hx    • Stroke Neg Hx        ROS:  Review of Systems   Constitutional: Negative for fever, chills, weight loss and malaise/fatigue.   HENT: Negative for ear pain, nosebleeds, congestion, sore throat and neck pain.    Eyes: Negative for blurred vision.   Respiratory: Negative for cough, sputum production, shortness of breath and wheezing.    Cardiovascular: Negative for chest pain, palpitations, orthopnea and leg swelling.   Gastrointestinal: Positive for for heartburn, positive for nausea, vomiting and abdominal pain.   Genitourinary: Negative for dysuria, urgency and frequency.     Exam:  /82 (BP Location: Right arm, Patient Position: Sitting, BP Cuff Size: Large adult)   Pulse 96   Temp 36.3 °C (97.4 °F) (Temporal)   Resp 16   Ht 1.549 m (5' 1\")   Wt 86.6 kg (191 lb)   SpO2 95%   General:  Well nourished, well developed female in NAD  Head:Normocephalic, atraumatic  Eyes: PERRLA, EOM within normal " limits, no conjunctival injection, no scleral icterus, visual fields and acuity grossly intact.  Nose: Symmetrical without tenderness, no discharge.  Mouth: reasonable hygiene, no erythema exudates or tonsillar enlargement.  Neck: no masses, range of motion within normal limits, no tracheal deviation. No obvious thyroid enlargement.  Extremities: no clubbing, cyanosis, or edema.    Please note that this dictation was created using voice recognition software. I have made every reasonable attempt to correct obvious errors, but I expect that there are errors of grammar and possibly content that I did not discover before finalizing the note.    Assessment/Plan:  1. Acute gastritis without hemorrhage, unspecified gastritis type  omeprazole (PRILOSEC) 20 MG delayed-release capsule   She smokes and drinks caffeine.  Recommended avoidance of triggers    Followup with primary care in the next 7-10 days if not significantly improving, return to the urgent care or go to the emergency room sooner for any worsening of symptoms.

## 2022-11-17 ENCOUNTER — TELEPHONE (OUTPATIENT)
Dept: HEALTH INFORMATION MANAGEMENT | Facility: OTHER | Age: 62
End: 2022-11-17

## 2023-01-27 ENCOUNTER — TELEPHONE (OUTPATIENT)
Dept: NEUROLOGY | Facility: MEDICAL CENTER | Age: 63
End: 2023-01-27
Payer: MEDICAID

## 2023-01-27 NOTE — TELEPHONE ENCOUNTER
NEUROLOGY PATIENT PRE-VISIT PLANNING     Patient was NOT contacted to complete PVP.  Note: Patient will not be contacted if there is no indication to call.     Patient Appointment is scheduled as: New Patient     Is visit type and length scheduled correctly? Yes    [employer] Patient is checked in Patient Demographics? Yes    3.   Is referral attached to visit? Yes    4. Were records received from referring provider? Yes    4. Patient was NOT contacted to have someone accompany them to visit.     5. Is this appointment scheduled as a Hospital Follow-Up?  No    6. Does the patient require any pre procedure or post procedure follow up? No    7. If any orders were placed at last visit or intended to be done for this visit do we have Results/Consult Notes? No  Labs - Labs were not ordered at last office visit.  Imaging - Imaging was not ordered at last office visit.  Referrals - No referrals were ordered at last office visit.  Note: If patient appointment is for lab or imaging review and patient did not complete the studies, check with provider if OK to reschedule patient until completed.    8. If patient appointment is for Botox - is order pended for provider? N/A

## 2023-01-31 ENCOUNTER — TELEPHONE (OUTPATIENT)
Dept: NEUROLOGY | Facility: MEDICAL CENTER | Age: 63
End: 2023-01-31
Payer: MEDICAID

## 2023-02-01 ENCOUNTER — OFFICE VISIT (OUTPATIENT)
Dept: NEUROLOGY | Facility: MEDICAL CENTER | Age: 63
End: 2023-02-01
Attending: PSYCHIATRY & NEUROLOGY
Payer: MEDICAID

## 2023-02-01 VITALS
HEART RATE: 95 BPM | OXYGEN SATURATION: 96 % | BODY MASS INDEX: 36.09 KG/M2 | DIASTOLIC BLOOD PRESSURE: 86 MMHG | RESPIRATION RATE: 18 BRPM | HEIGHT: 61 IN | TEMPERATURE: 98.7 F | SYSTOLIC BLOOD PRESSURE: 124 MMHG

## 2023-02-01 DIAGNOSIS — I63.9 ISCHEMIC STROKE (HCC): ICD-10-CM

## 2023-02-01 PROBLEM — F41.1 GAD (GENERALIZED ANXIETY DISORDER): Status: ACTIVE | Noted: 2020-10-21

## 2023-02-01 PROCEDURE — 99202 OFFICE O/P NEW SF 15 MIN: CPT | Performed by: PSYCHIATRY & NEUROLOGY

## 2023-02-01 PROCEDURE — 99204 OFFICE O/P NEW MOD 45 MIN: CPT | Performed by: PSYCHIATRY & NEUROLOGY

## 2023-02-01 RX ORDER — ASPIRIN 81 MG/1
81 TABLET, CHEWABLE ORAL DAILY
Qty: 90 TABLET | Refills: 2 | Status: SHIPPED | OUTPATIENT
Start: 2023-02-01 | End: 2023-10-04

## 2023-02-01 ASSESSMENT — PATIENT HEALTH QUESTIONNAIRE - PHQ9: CLINICAL INTERPRETATION OF PHQ2 SCORE: 0

## 2023-02-01 NOTE — Clinical Note
Request MRI brain images from [facility] in [place] to be pushed here to PACS and obtain a copy of the MRI report

## 2023-02-01 NOTE — PROGRESS NOTES
"Chief Complaint   Patient presents with    New Patient       History of present illness:  Adrianna Khan 62 y.o. female with fibromyalgia,  chronic low back and left arm/leg weakness. She was scheduled to have a lumbar MRI but days before this scan her left arm and leg \"went to sleep\" and did not return. She went to the ER and was told she did not have a stroke.      She was watching TV, using her phone, and when she got up, her leg had no strength so she fell down. This occurred on Nov 20. Her arm strength has improved but the left leg strength has not improved. Some days she is not able to walk.   She lives in [place] and was informed that her MRI brain at [place] demonstrated a stroke.   Currently her only medications are Norco and levothyroxine.     Past medical history:   Past Medical History:   Diagnosis Date    Anxiety 2/13/2014    Xanax/ativan prn, switches off    Arthritis     Neck, hands, low back, knees    Asthma     inhalers daily     Breath shortness     \"at times\"    Cancer (HCC) 1988    Uterine CA- hysterectomy    Chronic low back pain 2/13/2014    Due to working with horses Hc/apap     Chronic neck pain 2/13/2014    Years hc/apap    Depression 2/13/2014    cymbalta     Dyslipidemia 9/19/2014    Has tricor, hasn't started it - afraid it would interfere with her thyroid meds Will check tft then start tricor - rpeta tft x 3 months    Fibromyalgia 2/13/2014    dx'd around 2005 cymbalta    Graves disease     Heart burn     Hiatus hernia syndrome     Surgically repaired    History of positive PPD     Hypothyroid 2/13/2014    meds since around 2000    Insomnia 2/13/2014    Amitriptyline - helps but wt gain Xanax/ativan for sleep too as needed    Left knee pain 9/19/2014    Already has an appt with dr alston in [place] at the end of hte month - just needs a referral    Pneumonia 2014       Past surgical history:   Past Surgical History:   Procedure Laterality Date    BLEPHAROPLASTY Bilateral 2/9/2018    " Procedure: BLEPHAROPLASTY - LOWER;  Surgeon: Kenji Joiner M.D.;  Location: SURGERY [facility];  Service: Plastics    MAMMOPLASTY AUGMENTATION Bilateral 2/9/2018    Procedure: MAMMOPLASTY AUGMENTATION;  Surgeon: Kenji Joiner M.D.;  Location: SURGERY [facility];  Service: Plastics    MASTOPEXY Bilateral 12/1/2017    Procedure: MASTOPEXY;  Surgeon: Kenji Joiner M.D.;  Location: SURGERY [facility];  Service: Plastics    KNEE ARTHROSCOPY Right 02/2016    KNEE ARTHROPLASTY TOTAL Left 11/17/2015    left total knee replacement 11/2015    HERNIA REPAIR  2008    GYN SURGERY  1991    ABDOMINAL HYSTERECTOMY TOTAL  1988    total    CHOLECYSTECTOMY  1987    open    EXPLORATORY LAPAROTOMY  1981    tubal pregnancy    APPENDECTOMY  1980       Family history:   Family History   Problem Relation Age of Onset    Cancer Maternal Aunt         breast    Diabetes Neg Hx     Heart Disease Neg Hx     Stroke Neg Hx        Social history:   Social History     Socioeconomic History    Marital status: Single     Spouse name: Not on file    Number of children: Not on file    Years of education: Not on file    Highest education level: Not on file   Occupational History    Not on file   Tobacco Use    Smoking status: Some Days     Packs/day: 0.50     Years: 40.00     Pack years: 20.00     Types: Cigarettes    Smokeless tobacco: Never   Substance and Sexual Activity    Alcohol use: No    Drug use: Yes     Types: Oral     Comment: denies h/o heroin, cocaine, meth, IVDU, Denies current use (uses oral medical marijuana)    Sexual activity: Not Currently     Comment:  around 2010   Other Topics Concern    Not on file   Social History Narrative    Not on file     Social Determinants of Health     Financial Resource Strain: Not on file   Food Insecurity: Not on file   Transportation Needs: Not on file   Physical Activity: Not on file   Stress: Not on file   Social Connections: Not on file   Intimate Partner  "Violence: Not on file   Housing Stability: Not on file       Current medications:   Current Outpatient Medications   Medication    aspirin (ASA) 81 MG Chew Tab chewable tablet    mirtazapine (REMERON) 15 MG Tab    levothyroxine (SYNTHROID) 200 MCG Tab    BD PEN NEEDLE ADELFO U/F    PROVENTIL  (90 Base) MCG/ACT Aero Soln inhalation aerosol    hydrocodone/acetaminophen (NORCO)  MG Tab    topiramate (TOPAMAX) 50 MG tablet    topiramate (TOPAMAX) 25 MG Tab    morphine ER (MS CONTIN) 30 MG Tab CR tablet    mirtazapine (REMERON) 30 MG Tab tablet    VICTOZA 18 MG/3ML Solution Pen-injector    clonazePAM (KLONOPIN) 1 MG Tab    omeprazole (PRILOSEC) 20 MG delayed-release capsule    levothyroxine (SYNTHROID) 175 MCG Tab    nicotine (NICODERM) 21 MG/24HR PATCH 24 HR    tramadol (ULTRAM) 50 MG Tab    alprazolam (XANAX) 1 MG Tab    duloxetine (CYMBALTA) 60 MG CPEP delayed-release capsule     No current facility-administered medications for this visit.       Medication Allergy:  Allergies   Allergen Reactions    Nubain [Nalbuphine] Itching     \"severe itching\"    Iodine      Blisters, topical \"no issues with IV contrast\"    Methadone Hcl Vomiting    Morphine Vomiting    Pcn [Penicillins] Rash     .    Prednisone Rash     .       Physical examination:   Vitals:    02/01/23 1528   BP: 124/86   BP Location: Left arm   Patient Position: Sitting   BP Cuff Size: Adult   Pulse: 95   Resp: 18   Temp: 37.1 °C (98.7 °F)   TempSrc: Temporal   SpO2: 96%   Height: 1.549 m (5' 1\")     Neurological Exam  Mental Status  Awake and alert. Speech is normal. Language is fluent with no aphasia.    Cranial Nerves  CN III, IV, VI: Extraocular movements intact bilaterally. No nystagmus. Normal smooth pursuit.  CN V:  Right: Facial sensation is normal.  Left: Facial sensation is normal on the left.  CN VII:  Right: There is no facial weakness.  Left: There is no facial weakness.    Motor   Normal muscle tone. No abnormal involuntary " movements.  Mildly decreased strength with left hip flexion - there is giveaway weakness   Normal distal lower extremity strength bilaterally .    Sensory  Light touch is normal in upper and lower extremities.     Reflexes                                            Right                      Left  Patellar                                Tr                          Achilles                                                        2+    Coordination  Right: Finger-to-nose normal. Heel-to-shin normal.Left: Finger-to-nose normal. Heel-to-shin normal.    Gait  Casual gait is normal including stance, stride, and arm swing.  Drags her left leg when she ambulates .    Labs:  I reviewed the following labs personally:  None     Imaging:   MR-CERVICAL SPINE-W/O  Order: [id number]  Impression      1.  Stable mild spinal straightening and spondylosis consisting of disc bulges   and endplate bone spurring causing mild canal stenosis without cord impingement   from C4-5 through C6-7.  Mild uncovertebral and facet joint hypertrophy results   in moderate bilateral foraminal narrowing at C5-6.     ASSESSMENT AND PLAN:  Problem List Items Addressed This Visit    None  Visit Diagnoses       Ischemic stroke (HCC)        Relevant Medications    aspirin (ASA) 81 MG Chew Tab chewable tablet    Other Relevant Orders    CT-CTA HEAD WITH & W/O-POST PROCESS    CT-CTA NECK WITH & W/O-POST PROCESSING    EC-ECHOCARDIOGRAM COMPLETE W/ CONT    Lipid Profile    HEMOGLOBIN A1C            1. Ischemic stroke (HCC)  - CT-CTA HEAD WITH & W/O-POST PROCESS; Future  - CT-CTA NECK WITH & W/O-POST PROCESSING; Future  - aspirin (ASA) 81 MG Chew Tab chewable tablet; Chew 1 Tablet every day.  Dispense: 90 Tablet; Refill: 2  - EC-ECHOCARDIOGRAM COMPLETE W/ CONT; Future  - Lipid Profile; Future  - HEMOGLOBIN A1C; Future    62-year-old female who endorses that she had an ischemic stroke causing left-sided weakness in November.  This supposedly was diagnosed after a  doctor ordered a brain MRI scan that demonstrated an ischemic stroke.  I do not have records or the images from the MRI that demonstrated this.  This history is from speaking with the patient.  On her examination, there is dragging of the left leg.  There is some giveaway weakness with left hip flexion.  There is no other abnormality on her neurologic examination.  The patient was requesting a renewal of her benzodiazepine drugs for the treatment of her anxiety disorder, which I declined.  I advised her to seek these from her primary care physician, given that I am following up with her stroke symptoms.  I ordered a CT angiogram of the head and neck as well as an echocardiogram, and advised her to start aspirin.  I we will request the MRI images from [facility] in [place] to be forwarded here.     FOLLOW-UP:   Return in about 8 weeks (around 3/29/2023).    Total time spent for the day for this patient unrelated to procedure time is: 40 minutes. I spent 27 minutes in face to face time and I spent 9 minutes pre-charting and 4 minutes in post-visit documentation.      Dr. Royce Benson D.O.  [facility]

## 2023-02-02 NOTE — PATIENT INSTRUCTIONS
I have ordered a CT angiogram of the head and neck as well as an echocardiogram (ultrasound) of the heart     Start a daily baby aspirin 81mg for stroke prevention     Go to the lab to have your blood drawn

## 2023-02-07 DIAGNOSIS — I63.9 ISCHEMIC STROKE (HCC): ICD-10-CM

## 2023-02-22 ENCOUNTER — APPOINTMENT (OUTPATIENT)
Dept: RADIOLOGY | Facility: MEDICAL CENTER | Age: 63
End: 2023-02-22
Attending: PSYCHIATRY & NEUROLOGY
Payer: MEDICAID

## 2023-02-23 ENCOUNTER — HOSPITAL ENCOUNTER (OUTPATIENT)
Dept: LAB | Facility: MEDICAL CENTER | Age: 63
End: 2023-02-23
Attending: PSYCHIATRY & NEUROLOGY
Payer: MEDICAID

## 2023-02-23 ENCOUNTER — APPOINTMENT (OUTPATIENT)
Dept: RADIOLOGY | Facility: MEDICAL CENTER | Age: 63
End: 2023-02-23
Attending: PSYCHIATRY & NEUROLOGY
Payer: MEDICAID

## 2023-02-23 DIAGNOSIS — I63.9 ISCHEMIC STROKE (HCC): ICD-10-CM

## 2023-02-23 PROCEDURE — 82565 ASSAY OF CREATININE: CPT

## 2023-02-23 PROCEDURE — 36415 COLL VENOUS BLD VENIPUNCTURE: CPT

## 2023-02-23 PROCEDURE — 84520 ASSAY OF UREA NITROGEN: CPT

## 2023-02-23 PROCEDURE — 83036 HEMOGLOBIN GLYCOSYLATED A1C: CPT

## 2023-02-24 LAB
BUN SERPL-MCNC: 15 MG/DL (ref 8–22)
CREAT SERPL-MCNC: 1 MG/DL (ref 0.5–1.4)
EST. AVERAGE GLUCOSE BLD GHB EST-MCNC: 114 MG/DL
GFR SERPLBLD CREATININE-BSD FMLA CKD-EPI: 64 ML/MIN/1.73 M 2
HBA1C MFR BLD: 5.6 % (ref 4–5.6)

## 2023-02-27 ENCOUNTER — HOSPITAL ENCOUNTER (OUTPATIENT)
Dept: RADIOLOGY | Facility: MEDICAL CENTER | Age: 63
End: 2023-02-27
Attending: PSYCHIATRY & NEUROLOGY
Payer: MEDICAID

## 2023-02-27 DIAGNOSIS — I63.9 ISCHEMIC STROKE (HCC): ICD-10-CM

## 2023-03-09 ENCOUNTER — APPOINTMENT (OUTPATIENT)
Dept: RADIOLOGY | Facility: MEDICAL CENTER | Age: 63
End: 2023-03-09
Attending: PSYCHIATRY & NEUROLOGY
Payer: MEDICAID

## 2023-04-25 DIAGNOSIS — I63.9 ISCHEMIC STROKE (HCC): ICD-10-CM

## 2023-04-26 ENCOUNTER — APPOINTMENT (OUTPATIENT)
Dept: RADIOLOGY | Facility: MEDICAL CENTER | Age: 63
End: 2023-04-26
Attending: PSYCHIATRY & NEUROLOGY
Payer: MEDICAID

## 2023-05-08 ENCOUNTER — APPOINTMENT (OUTPATIENT)
Dept: RADIOLOGY | Facility: MEDICAL CENTER | Age: 63
End: 2023-05-08
Attending: PSYCHIATRY & NEUROLOGY
Payer: MEDICAID

## 2023-05-09 ENCOUNTER — TELEPHONE (OUTPATIENT)
Dept: NEUROLOGY | Facility: MEDICAL CENTER | Age: 63
End: 2023-05-09
Payer: MEDICAID

## 2023-05-09 ENCOUNTER — OFFICE VISIT (OUTPATIENT)
Dept: URGENT CARE | Facility: PHYSICIAN GROUP | Age: 63
End: 2023-05-09
Payer: MEDICAID

## 2023-05-09 ENCOUNTER — HOSPITAL ENCOUNTER (OUTPATIENT)
Dept: LAB | Facility: MEDICAL CENTER | Age: 63
End: 2023-05-09
Attending: PSYCHIATRY & NEUROLOGY
Payer: MEDICAID

## 2023-05-09 VITALS
SYSTOLIC BLOOD PRESSURE: 128 MMHG | HEART RATE: 87 BPM | WEIGHT: 184 LBS | OXYGEN SATURATION: 95 % | RESPIRATION RATE: 18 BRPM | BODY MASS INDEX: 34.74 KG/M2 | DIASTOLIC BLOOD PRESSURE: 80 MMHG | TEMPERATURE: 96.8 F | HEIGHT: 61 IN

## 2023-05-09 DIAGNOSIS — Z91.09 ENVIRONMENTAL ALLERGIES: ICD-10-CM

## 2023-05-09 DIAGNOSIS — J45.21 MILD INTERMITTENT EXTRINSIC ASTHMA WITH ACUTE EXACERBATION: ICD-10-CM

## 2023-05-09 DIAGNOSIS — I63.9 ISCHEMIC STROKE (HCC): ICD-10-CM

## 2023-05-09 DIAGNOSIS — Z72.0 TOBACCO USER: ICD-10-CM

## 2023-05-09 LAB
CREAT SERPL-MCNC: 0.99 MG/DL (ref 0.5–1.4)
GFR SERPLBLD CREATININE-BSD FMLA CKD-EPI: 65 ML/MIN/1.73 M 2

## 2023-05-09 PROCEDURE — 82565 ASSAY OF CREATININE: CPT

## 2023-05-09 PROCEDURE — 99406 BEHAV CHNG SMOKING 3-10 MIN: CPT | Performed by: FAMILY MEDICINE

## 2023-05-09 PROCEDURE — 36415 COLL VENOUS BLD VENIPUNCTURE: CPT

## 2023-05-09 PROCEDURE — 99214 OFFICE O/P EST MOD 30 MIN: CPT | Mod: 25 | Performed by: FAMILY MEDICINE

## 2023-05-09 RX ORDER — ALBUTEROL SULFATE 90 MCG
HFA AEROSOL WITH ADAPTER (GRAM) INHALATION
Qty: 8.5 G | Refills: 0 | Status: SHIPPED | OUTPATIENT
Start: 2023-05-09 | End: 2023-10-04 | Stop reason: SDUPTHER

## 2023-05-09 RX ORDER — HYDROCODONE BITARTRATE AND ACETAMINOPHEN 10; 325 MG/1; MG/1
TABLET ORAL
COMMUNITY

## 2023-05-09 RX ORDER — LORATADINE 10 MG/1
1 CAPSULE, LIQUID FILLED ORAL
Qty: 30 CAPSULE | Refills: 0 | Status: SHIPPED | OUTPATIENT
Start: 2023-05-09

## 2023-05-09 NOTE — TELEPHONE ENCOUNTER
NEUROLOGY PATIENT PRE-VISIT PLANNING     Patient was NOT contacted to complete PVP.  Note: Patient will not be contacted if there is no indication to call.     Patient Appointment is scheduled as: Established Patient     Is visit type and length scheduled correctly? Yes    [facility] in Patient Demographics? Yes    3.   Is referral attached to visit? Yes    4. Were records received from referring provider? Yes    4. Patient was NOT contacted to have someone accompany them to visit.     5. Is this appointment scheduled as a Hospital Follow-Up?  No    6. Does the patient require any pre procedure or post procedure follow up? No    7. If any orders were placed at last visit or intended to be done for this visit do we have Results/Consult Notes? No  Labs - Labs ordered, completed on 4/25/2023 and results are in chart.  Imaging - Imaging ordered, completed and results are in chart.  Referrals - No referrals were ordered at last office visit.  Note: If patient appointment is for lab or imaging review and patient did not complete the studies, check with provider if OK to reschedule patient until completed.    8. If patient appointment is for Botox - is order pended for provider? N/A

## 2023-05-09 NOTE — PROGRESS NOTES
"  Subjective:      62 y.o. female presents to urgent care for allergy induced asthma. Around this time of year her allergies act up as she is allergic to korey brush. She is currently experiencing cough, wheezing, and shortness of breath. She is not taking an antihistamine at this time, and has run out of her albuterol.     Asthma exacerbation considerations:  -Compliance with chronic asthma medications: yes  -Prior history of intubation needed to asthma: no  -Emergency department visit within the last year: no  -Intensive care unit admissions within the last year: no  -Steroid use within the last year: no  -Tobacco product use: yes. She smokes 0.5 packs of cigarettes per day     She denies any other questions or concerns at this time.    Current problem list, medication, and past medical/surgical history were reviewed in Epic.    ROS  See HPI     Objective:      /80   Pulse 87   Temp 36 °C (96.8 °F) (Temporal)   Resp 18   Ht 1.549 m (5' 1\")   Wt 83.5 kg (184 lb)   SpO2 95%   BMI 34.77 kg/m²     Physical Exam  Constitutional:       General: She is not in acute distress.     Appearance: She is not diaphoretic.   HENT:      Right Ear: Tympanic membrane, ear canal and external ear normal.      Left Ear: Tympanic membrane, ear canal and external ear normal.      Mouth/Throat:      Tongue: Tongue does not deviate from midline.      Palate: No lesions.      Pharynx: Uvula midline. No posterior oropharyngeal erythema.      Tonsils: No tonsillar exudate.   Cardiovascular:      Rate and Rhythm: Normal rate and regular rhythm.      Heart sounds: Normal heart sounds.   Pulmonary:      Effort: Pulmonary effort is normal. No respiratory distress.      Breath sounds: Normal breath sounds.   Neurological:      Mental Status: She is alert.   Psychiatric:         Mood and Affect: Affect normal.         Judgment: Judgment normal.     Assessment/Plan:     1. Mild intermittent extrinsic asthma with acute exacerbation  2. " Environmental allergies  Acute on chronic issue.  Prescription for Claritin and Proventil have been sent.  Follow with PCP.  - PROVENTIL  (90 Base) MCG/ACT Aero Soln inhalation aerosol; INL 2 PFS PO QID PRN  Dispense: 8.5 g; Refill: 0  - Loratadine (CLARITIN) 10 MG Cap; Take 1 Tablet by mouth 1 time a day as needed (allergies).  Dispense: 30 Capsule; Refill: 0    3. Tobacco user  4 minutes was spent in educating patient of health risks associated with tobacco, nicotine, and vaping. Verbalized understanding. She has been successful in quitting in the past, the longest she has quit for previously was 4 years.  She is interested in quitting again.  I have given her the resource 1 800 quit now.        Instructed to return to Urgent Care or nearest Emergency Department if symptoms fail to improve, for any change in condition, further concerns, or new concerning symptoms. Patient states understanding of the plan of care and discharge instructions.    Shaila Al M.D.

## 2023-05-10 ENCOUNTER — HOSPITAL ENCOUNTER (OUTPATIENT)
Dept: RADIOLOGY | Facility: MEDICAL CENTER | Age: 63
End: 2023-05-10
Attending: PSYCHIATRY & NEUROLOGY
Payer: MEDICAID

## 2023-05-10 PROCEDURE — 70498 CT ANGIOGRAPHY NECK: CPT

## 2023-05-10 PROCEDURE — 70496 CT ANGIOGRAPHY HEAD: CPT

## 2023-05-10 PROCEDURE — 700117 HCHG RX CONTRAST REV CODE 255: Performed by: PSYCHIATRY & NEUROLOGY

## 2023-05-10 RX ADMIN — IOHEXOL 100 ML: 350 INJECTION, SOLUTION INTRAVENOUS at 15:45

## 2023-05-11 ENCOUNTER — APPOINTMENT (OUTPATIENT)
Dept: NEUROLOGY | Facility: MEDICAL CENTER | Age: 63
End: 2023-05-11
Attending: PSYCHIATRY & NEUROLOGY
Payer: MEDICAID

## 2023-05-11 NOTE — PROGRESS NOTES
"No chief complaint on file.      History of present illness:  Adrianna Khan 62 y.o. female with fibromyalgia,  chronic low back and left arm/leg weakness. She was scheduled to have a lumbar MRI but days before this scan her left arm and leg \"went to sleep\" and did not return. She went to the ER and was told she did not have a stroke.      She was watching TV, using her phone, and when she got up, her leg had no strength so she fell down. This occurred on Nov 20. Her arm strength has improved but the left leg strength has not improved. Some days she is not able to walk.   She lives in [place] and was informed that her MRI brain at [place] demonstrated a stroke.   Currently her only medications are Norco and levothyroxine.      Past medical history:   Past Medical History:   Diagnosis Date    Anxiety 2/13/2014    Xanax/ativan prn, switches off    Arthritis     Neck, hands, low back, knees    Asthma     inhalers daily     Breath shortness     \"at times\"    Cancer (HCC) 1988    Uterine CA- hysterectomy    Chronic low back pain 2/13/2014    Due to working with horses Hc/apap     Chronic neck pain 2/13/2014    Years hc/apap    Depression 2/13/2014    cymbalta     Dyslipidemia 9/19/2014    Has tricor, hasn't started it - afraid it would interfere with her thyroid meds Will check tft then start tricor - rpeta tft x 3 months    Fibromyalgia 2/13/2014    dx'd around 2005 cymbalta    Graves disease     Heart burn     Hiatus hernia syndrome     Surgically repaired    History of positive PPD     Hypothyroid 2/13/2014    meds since around 2000    Insomnia 2/13/2014    Amitriptyline - helps but wt gain Xanax/ativan for sleep too as needed    Left knee pain 9/19/2014    Already has an appt with dr alston in [place] at the end of hte month - just needs a referral    Pneumonia 2014       Past surgical history:   Past Surgical History:   Procedure Laterality Date    BLEPHAROPLASTY Bilateral 2/9/2018    Procedure: BLEPHAROPLASTY - " LOWER;  Surgeon: Kenji Joiner M.D.;  Location: SURGERY [facility];  Service: Plastics    MAMMOPLASTY AUGMENTATION Bilateral 2/9/2018    Procedure: MAMMOPLASTY AUGMENTATION;  Surgeon: Kenji Joiner M.D.;  Location: SURGERY [facility];  Service: Plastics    MASTOPEXY Bilateral 12/1/2017    Procedure: MASTOPEXY;  Surgeon: Kenji Joiner M.D.;  Location: SURGERY [facility];  Service: Plastics    KNEE ARTHROSCOPY Right 02/2016    KNEE ARTHROPLASTY TOTAL Left 11/17/2015    left total knee replacement 11/2015    HERNIA REPAIR  2008    GYN SURGERY  1991    ABDOMINAL HYSTERECTOMY TOTAL  1988    total    CHOLECYSTECTOMY  1987    open    EXPLORATORY LAPAROTOMY  1981    tubal pregnancy    APPENDECTOMY  1980       Family history:   Family History   Problem Relation Age of Onset    Cancer Maternal Aunt         breast    Diabetes Neg Hx     Heart Disease Neg Hx     Stroke Neg Hx        Social history:   Social History     Socioeconomic History    Marital status: Single     Spouse name: Not on file    Number of children: Not on file    Years of education: Not on file    Highest education level: Not on file   Occupational History    Not on file   Tobacco Use    Smoking status: Some Days     Packs/day: 0.50     Years: 40.00     Pack years: 20.00     Types: Cigarettes    Smokeless tobacco: Never   Substance and Sexual Activity    Alcohol use: No    Drug use: Yes     Types: Oral     Comment: denies h/o heroin, cocaine, meth, IVDU, Denies current use (uses oral medical marijuana)    Sexual activity: Not Currently     Comment:  around 2010   Other Topics Concern    Not on file   Social History Narrative    Not on file     Social Determinants of Health     Financial Resource Strain: Not on file   Food Insecurity: Not on file   Transportation Needs: Not on file   Physical Activity: Not on file   Stress: Not on file   Social Connections: Not on file   Intimate Partner Violence: Not on file   Housing  "Stability: Not on file       Current medications:   Current Outpatient Medications   Medication    HYDROcodone/acetaminophen (NORCO)  MG Tab    PROVENTIL  (90 Base) MCG/ACT Aero Soln inhalation aerosol    Loratadine (CLARITIN) 10 MG Cap    aspirin (ASA) 81 MG Chew Tab chewable tablet    levothyroxine (SYNTHROID) 200 MCG Tab    BD PEN NEEDLE ADELFO U/F     No current facility-administered medications for this visit.       Medication Allergy:  Allergies   Allergen Reactions    Nubain [Nalbuphine] Itching     \"severe itching\"    Iodine      Blisters, topical \"no issues with IV contrast\"    Methadone Hcl Vomiting    Morphine Vomiting    Pcn [Penicillins] Rash     .    Prednisone Rash     .       Physical examination:   There were no vitals filed for this visit.  Neurological Exam     Labs:  I reviewed the following labs personally:  ***    Imaging:   CTA neck   IMPRESSION:     1.  No evidence of carotid or vertebral arterial occlusion or dissection.     2.  Mild atherosclerotic plaque involving the carotid bifurcations bilaterally. No flow-limiting stenosis.     3.  Motion degraded exam.     4.  Tortuous common and internal carotid arteries bilaterally.    ASSESSMENT AND PLAN:  Problem List Items Addressed This Visit    None      There are no diagnoses linked to this encounter.    FOLLOW-UP:   No follow-ups on file.    Total time spent for the day for this patient unrelated to procedure time is: *** minutes. I spent *** minutes in face to face time and I spent *** minutes pre-charting and *** minutes in post-visit documentation.      SANGITA DunbarO.  [facility]rology    "

## 2023-05-31 DIAGNOSIS — J45.21 MILD INTERMITTENT EXTRINSIC ASTHMA WITH ACUTE EXACERBATION: ICD-10-CM

## 2023-08-07 RX ORDER — LORATADINE 10 MG/1
TABLET ORAL
Qty: 90 TABLET | OUTPATIENT
Start: 2023-08-07

## 2023-08-11 ENCOUNTER — APPOINTMENT (OUTPATIENT)
Dept: NEUROLOGY | Facility: MEDICAL CENTER | Age: 63
End: 2023-08-11
Attending: PSYCHIATRY & NEUROLOGY
Payer: MEDICAID

## 2023-08-29 RX ORDER — ALBUTEROL SULFATE 90 UG/1
AEROSOL, METERED RESPIRATORY (INHALATION)
Qty: 18 G | OUTPATIENT
Start: 2023-08-29

## 2023-10-04 ENCOUNTER — HOSPITAL ENCOUNTER (OUTPATIENT)
Dept: LAB | Facility: MEDICAL CENTER | Age: 63
End: 2023-10-04
Attending: NURSE PRACTITIONER
Payer: MEDICAID

## 2023-10-04 ENCOUNTER — OFFICE VISIT (OUTPATIENT)
Dept: URGENT CARE | Facility: PHYSICIAN GROUP | Age: 63
End: 2023-10-04
Payer: MEDICAID

## 2023-10-04 VITALS
HEIGHT: 61 IN | BODY MASS INDEX: 31.72 KG/M2 | TEMPERATURE: 97 F | DIASTOLIC BLOOD PRESSURE: 82 MMHG | RESPIRATION RATE: 16 BRPM | OXYGEN SATURATION: 97 % | HEART RATE: 80 BPM | WEIGHT: 168 LBS | SYSTOLIC BLOOD PRESSURE: 122 MMHG

## 2023-10-04 DIAGNOSIS — J45.21 MILD INTERMITTENT EXTRINSIC ASTHMA WITH ACUTE EXACERBATION: ICD-10-CM

## 2023-10-04 DIAGNOSIS — F17.210 TOBACCO DEPENDENCE DUE TO CIGARETTES: ICD-10-CM

## 2023-10-04 DIAGNOSIS — Z87.898 HISTORY OF WHEEZING: ICD-10-CM

## 2023-10-04 DIAGNOSIS — E05.00 GRAVES DISEASE: ICD-10-CM

## 2023-10-04 PROBLEM — E78.5 HYPERLIPIDEMIA: Status: ACTIVE | Noted: 2023-09-14

## 2023-10-04 PROBLEM — M47.816 LUMBAR SPONDYLOSIS: Status: ACTIVE | Noted: 2022-10-03

## 2023-10-04 PROBLEM — G81.94 LEFT HEMIPARESIS (HCC): Status: ACTIVE | Noted: 2022-11-15

## 2023-10-04 PROBLEM — M25.561 ARTHRALGIA OF RIGHT KNEE: Status: ACTIVE | Noted: 2022-10-03

## 2023-10-04 PROBLEM — M25.551 PAIN OF RIGHT HIP JOINT: Status: ACTIVE | Noted: 2022-10-03

## 2023-10-04 PROBLEM — I63.9 ISCHEMIC STROKE (HCC): Status: ACTIVE | Noted: 2023-07-04

## 2023-10-04 LAB — TSH SERPL DL<=0.005 MIU/L-ACNC: 1.74 UIU/ML (ref 0.38–5.33)

## 2023-10-04 PROCEDURE — 99406 BEHAV CHNG SMOKING 3-10 MIN: CPT | Performed by: NURSE PRACTITIONER

## 2023-10-04 PROCEDURE — 36415 COLL VENOUS BLD VENIPUNCTURE: CPT

## 2023-10-04 PROCEDURE — 3079F DIAST BP 80-89 MM HG: CPT | Performed by: NURSE PRACTITIONER

## 2023-10-04 PROCEDURE — 84443 ASSAY THYROID STIM HORMONE: CPT

## 2023-10-04 PROCEDURE — 3074F SYST BP LT 130 MM HG: CPT | Performed by: NURSE PRACTITIONER

## 2023-10-04 PROCEDURE — 99214 OFFICE O/P EST MOD 30 MIN: CPT | Mod: 25 | Performed by: NURSE PRACTITIONER

## 2023-10-04 RX ORDER — ALBUTEROL SULFATE 90 MCG
HFA AEROSOL WITH ADAPTER (GRAM) INHALATION
Qty: 8.5 G | Refills: 0 | Status: SHIPPED | OUTPATIENT
Start: 2023-10-04

## 2023-10-04 RX ORDER — TIZANIDINE 4 MG/1
TABLET ORAL
COMMUNITY
Start: 2023-08-10

## 2023-10-04 RX ORDER — TIZANIDINE 4 MG/1
TABLET ORAL
COMMUNITY
Start: 2023-09-05 | End: 2023-10-04

## 2023-10-04 RX ORDER — ASPIRIN 81 MG/1
TABLET ORAL
COMMUNITY

## 2023-10-04 RX ORDER — LEVOTHYROXINE SODIUM 0.2 MG/1
200 TABLET ORAL
Qty: 30 TABLET | Refills: 0 | Status: SHIPPED | OUTPATIENT
Start: 2023-10-04 | End: 2023-11-03

## 2023-10-04 NOTE — PROGRESS NOTES
"Subjective:   Adrianna Khan is a 62 y.o. female who presents for Medication Refill (Pt does not have PCP, needs refill on her levothyroxine, proventil HFA, albuterol )      1. Graves disease  Is a chronic condition.  Patient states that she has had a long history of Graves' disease with previous radiation therapy.  She states that she has been on levothyroxine 200 mcg daily for several years has been tolerated it well.  She is unsure when her last labs were completed.  She denies any symptoms of hyper or hypothyroidism.    2. Mild intermittent extrinsic asthma with acute exacerbation  This is a chronic and well-controlled condition.  Patient states that she gets intermittent asthma symptoms including shortness of breath, wheezing, and dry cough that is resolved with her albuterol inhaler however she is currently out of her medication and is requesting refill today.  She denies any nocturnal asthma symptoms.  Patient does continue to smoke cigarettes and is aware the risk associated with ongoing tobacco abuse.  - PROVENTIL  (90 Base) MCG/ACT Aero Soln inhalation aerosol; INL 2 PFS PO QID PRN  Dispense: 8.5 g; Refill: 0  - Referral to establish with [facility]      Medications, Allergies, and current problem list reviewed today in Epic.     Objective:     /82   Pulse 80   Temp 36.1 °C (97 °F) (Temporal)   Resp 16   Ht 1.549 m (5' 1\")   Wt 76.2 kg (168 lb)   SpO2 97%     Physical Exam  Vitals reviewed.   Constitutional:       General: She is not in acute distress.     Appearance: Normal appearance. She is not ill-appearing or toxic-appearing.   HENT:      Head: Normocephalic.      Nose: Nose normal.      Mouth/Throat:      Mouth: Mucous membranes are moist.   Eyes:      Extraocular Movements: Extraocular movements intact.      Conjunctiva/sclera: Conjunctivae normal.      Pupils: Pupils are equal, round, and reactive to light.   Neck:      Thyroid: No thyroid mass, thyromegaly or thyroid " tenderness.   Cardiovascular:      Rate and Rhythm: Normal rate.   Pulmonary:      Effort: Pulmonary effort is normal.      Breath sounds: Normal breath sounds. No wheezing or rhonchi.   Musculoskeletal:         General: Normal range of motion.      Cervical back: Normal range of motion and neck supple.   Lymphadenopathy:      Cervical: No cervical adenopathy.   Skin:     General: Skin is warm and dry.   Neurological:      Mental Status: She is alert and oriented to person, place, and time.   Psychiatric:         Mood and Affect: Mood normal.         Behavior: Behavior normal.         Thought Content: Thought content normal.         Judgment: Judgment normal.         Assessment/Plan:     Diagnosis and associated orders:     1. Graves disease  levothyroxine (SYNTHROID) 200 MCG Tab    TSH WITH REFLEX TO FT4    Referral to establish with [facility]CP      2. Mild intermittent extrinsic asthma with acute exacerbation  PROVENTIL  (90 Base) MCG/ACT Aero Soln inhalation aerosol    Referral to establish with [facility]CP      3. History of wheezing  PROVENTIL  (90 Base) MCG/ACT Aero Soln inhalation aerosol    Referral to establish with [facility]CP      4. Tobacco dependence due to cigarettes  Referral to establish with [facility]CP         Comments/MDM:     1. Graves disease  chronic condition.  Current status unknown.  TSH level with reflex ordered.  We will go ahead and refill Synthroid 200 mcg daily for 30 days.  Reviewed labs, no recent TSH level seen.  Patient does not have access to her patient portal at [facility].  Referral placed to establish with new primary care provider    - levothyroxine (SYNTHROID) 200 MCG Tab; Take 1 Tablet by mouth every morning on an empty stomach for 30 days.  Dispense: 30 Tablet; Refill: 0  - TSH WITH REFLEX TO FT4; Future  - Referral to establish with [facility]CP    2. Mild intermittent extrinsic asthma with acute exacerbation  Chronic and stable condition.  Lung sounds are clear on  physical exam.  Refill of Proventil sent to pharmacy.  No acute signs of asthma exacerbation.  - PROVENTIL  (90 Base) MCG/ACT Aero Soln inhalation aerosol; INL 2 PFS PO QID PRN  Dispense: 8.5 g; Refill: 0  - Referral to establish with [facility]    3. History of wheezing  - PROVENTIL  (90 Base) MCG/ACT Aero Soln inhalation aerosol; INL 2 PFS PO QID PRN  Dispense: 8.5 g; Refill: 0  - Referral to establish with [facility]CP    4. Tobacco dependence due to cigarettes  Chronic and ongoing health concern.  Did  patient for at least 3 minutes during our exam today in regards to the risk associated with tobacco use as well as smoking sensation.  Patient was highly encouraged to quit smoking especially with history of CVA.  - Referral to establish with [facility]CP    Other orders  - tizanidine (ZANAFLEX) 4 MG Tab; TAKE 1 TABLET BY MOUTH EVERY DAY AS NEEDED FOR SPASTIC PAIN  - aspirin 81 MG EC tablet; TAKE 1 TABLET BY MOUTH EVERY DAY FOR 90 DAYS             Differential diagnosis, natural history, supportive care, and indications for immediate follow-up discussed.    Advised the patient to follow-up with the primary care physician for recheck, reevaluation, and consideration of further management.    I personally reviewed prior external notes and test results pertinent to today's visit as well as additional imaging and testing completed in clinic today.     Please note that this dictation was created using voice recognition software. I have made a reasonable attempt to correct obvious errors, but I expect that there are errors of grammar and possibly content that I did not discover before finalizing the note.

## 2023-10-10 ENCOUNTER — TELEPHONE (OUTPATIENT)
Dept: HEALTH INFORMATION MANAGEMENT | Facility: OTHER | Age: 63
End: 2023-10-10
Payer: MEDICAID

## 2023-10-26 DIAGNOSIS — Z87.898 HISTORY OF WHEEZING: ICD-10-CM

## 2023-10-26 DIAGNOSIS — J45.21 MILD INTERMITTENT EXTRINSIC ASTHMA WITH ACUTE EXACERBATION: ICD-10-CM

## 2023-12-12 DIAGNOSIS — E05.00 GRAVES DISEASE: ICD-10-CM

## 2024-01-24 RX ORDER — ALBUTEROL SULFATE 90 UG/1
AEROSOL, METERED RESPIRATORY (INHALATION)
Qty: 18 G | OUTPATIENT
Start: 2024-01-24

## 2024-03-11 RX ORDER — LEVOTHYROXINE SODIUM 0.2 MG/1
200 TABLET ORAL
Qty: 30 TABLET | Refills: 0 | OUTPATIENT
Start: 2024-03-11

## 2024-05-28 ENCOUNTER — OFFICE VISIT (OUTPATIENT)
Dept: URGENT CARE | Facility: PHYSICIAN GROUP | Age: 64
End: 2024-05-28
Payer: MEDICAID

## 2024-05-28 VITALS
HEART RATE: 84 BPM | SYSTOLIC BLOOD PRESSURE: 120 MMHG | BODY MASS INDEX: 31.15 KG/M2 | DIASTOLIC BLOOD PRESSURE: 80 MMHG | TEMPERATURE: 96.8 F | HEIGHT: 61 IN | RESPIRATION RATE: 18 BRPM | WEIGHT: 165 LBS | OXYGEN SATURATION: 96 %

## 2024-05-28 DIAGNOSIS — J18.9 COMMUNITY ACQUIRED PNEUMONIA, UNSPECIFIED LATERALITY: ICD-10-CM

## 2024-05-28 PROCEDURE — 99213 OFFICE O/P EST LOW 20 MIN: CPT | Performed by: FAMILY MEDICINE

## 2024-05-28 PROCEDURE — 3074F SYST BP LT 130 MM HG: CPT | Performed by: FAMILY MEDICINE

## 2024-05-28 PROCEDURE — 3079F DIAST BP 80-89 MM HG: CPT | Performed by: FAMILY MEDICINE

## 2024-05-28 RX ORDER — LEVOTHYROXINE SODIUM 0.2 MG/1
TABLET ORAL
COMMUNITY
Start: 2024-03-11

## 2024-05-28 RX ORDER — BENZONATATE 200 MG/1
200 CAPSULE ORAL 3 TIMES DAILY PRN
Qty: 45 CAPSULE | Refills: 0 | Status: SHIPPED | OUTPATIENT
Start: 2024-05-28

## 2024-05-28 NOTE — LETTER
May 28, 2024         Patient: Adrianna Khan   YOB: 1960   Date of Visit: 5/28/2024           To Whom it May Concern:    Adrianna Khan was seen in my clinic on 5/28/2024. She may return to work on Friday.    If you have any questions or concerns, please don't hesitate to call.        Sincerely,           Thomas Perez M.D.  Electronically Signed

## 2024-05-29 NOTE — PROGRESS NOTES
"   Chief Complaint   Patient presents with    Cough     Went to er on Saturday and dx with pneumonia. Work needs release to return. Coughing while laying down at night.          CC:  cough        Cough         Here for f/u on pneumonia.       On day #4 of doxycycline.        Cough improved      Denies fever or SOB.     She does not have hx COPD or asthma.      Past Medical History:   Diagnosis Date    Dyslipidemia 9/19/2014    Has tricor, hasn't started it - afraid it would interfere with her thyroid meds Will check tft then start tricor - rpeta tft x 3 months    Left knee pain 9/19/2014    Already has an appt with dr alston in [place] at the end of hte month - just needs a referral    Hypothyroid 2/13/2014    meds since around 2000    Depression 2/13/2014    cymbalta     Fibromyalgia 2/13/2014    dx'd around 2005 cymbalta    Chronic low back pain 2/13/2014    Due to working with horses Hc/apap     Chronic neck pain 2/13/2014    Years hc/apap    Insomnia 2/13/2014    Amitriptyline - helps but wt gain Xanax/ativan for sleep too as needed    Anxiety 2/13/2014    Xanax/ativan prn, switches off    Pneumonia 2014    Cancer (HCC) 1988    Uterine CA- hysterectomy    Arthritis     Neck, hands, low back, knees    Asthma     inhalers daily     Breath shortness     \"at times\"    Graves disease     Heart burn     Hiatus hernia syndrome     Surgically repaired    History of positive PPD          Social History     Tobacco Use    Smoking status: Every Day     Current packs/day: 0.50     Average packs/day: 0.5 packs/day for 40.0 years (20.0 ttl pk-yrs)     Types: Cigarettes    Smokeless tobacco: Never   Vaping Use    Vaping status: Never Used   Substance Use Topics    Alcohol use: No    Drug use: Not Currently     Types: Oral     Comment: denies h/o heroin, cocaine, meth, IVDU, Denies current use (uses oral medical marijuana)         Current Outpatient Medications on File Prior to Visit   Medication Sig Dispense Refill    " "levothyroxine (SYNTHROID) 200 MCG Tab TAKE 1 TABLET BY MOUTH EVERY DAY BEFORE MEALS      tizanidine (ZANAFLEX) 4 MG Tab TAKE 1 TABLET BY MOUTH EVERY DAY AS NEEDED FOR SPASTIC PAIN      aspirin 81 MG EC tablet TAKE 1 TABLET BY MOUTH EVERY DAY FOR 90 DAYS      PROVENTIL  (90 Base) MCG/ACT Aero Soln inhalation aerosol INL 2 PFS PO QID PRN 8.5 g 0    HYDROcodone/acetaminophen (NORCO)  MG Tab hydrocodone 10 mg-acetaminophen 325 mg tablet   Take 1 tablet every 6 hours by oral route as needed for 30 days.      Loratadine (CLARITIN) 10 MG Cap Take 1 Tablet by mouth 1 time a day as needed (allergies). (Patient not taking: Reported on 5/28/2024) 30 Capsule 0    BD PEN NEEDLE ADELFO U/F USE 1 NEEDLE ONCE DAILY FOR 30 DAYS (Patient not taking: Reported on 10/4/2023)       No current facility-administered medications on file prior to visit.                    Review of Systems   Constitutional: Negative for fever and weight loss.   HENT: negative for otalgia  Cardiovascular - denies chest pain or dyspnea  Respiratory: Positive for cough.  .  Negative for wheezing.    Neurological: Negative for headaches.   GI - denies nausea, vomiting or diarrhea  Neuro - denies numbness or tingling.            Objective:     /80   Pulse 84   Temp 36 °C (96.8 °F) (Temporal)   Resp 18   Ht 1.549 m (5' 1\")   Wt 74.8 kg (165 lb)   SpO2 96%     Physical Exam   Constitutional: patient is oriented to person, place, and time. Patient appears well-developed and well-nourished. No distress.   HENT:   Head: Normocephalic and atraumatic.   Right Ear: External ear normal.   Left Ear: External ear normal.   Nose: Mucosal edema  present. Right sinus exhibits no maxillary sinus tenderness. Left sinus exhibits no maxillary sinus tenderness.   Mouth/Throat: Mucous membranes are normal. No oral lesions.  No posterior pharyngeal erythema.  No oropharyngeal exudate or posterior oropharyngeal edema.   Eyes: Conjunctivae and EOM are normal. " Pupils are equal, round, and reactive to light. Right eye exhibits no discharge. Left eye exhibits no discharge. No scleral icterus.   Neck: Normal range of motion. Neck supple. No tracheal deviation present.   Cardiovascular: Normal rate, regular rhythm and normal heart sounds.  Exam reveals no friction rub.    Pulmonary/Chest: Effort normal. No respiratory distress. Patient has no wheezes or rhonchi. Patient has no rales.    Musculoskeletal:  exhibits no edema.   Lymphadenopathy:     Patient has no cervical adenopathy.      Neurological: patient is alert and oriented to person, place, and time.   Skin: Skin is warm and dry. No rash noted. No erythema.   Psychiatric: patient  has a normal mood and affect.  behavior is normal.   Nursing note and vitals reviewed.              Assessment/Plan:       1. Community acquired pneumonia, unspecified laterality   Improving  Continue doxycycline      - benzonatate (TESSALON) 200 MG capsule; Take 1 Capsule by mouth 3 times a day as needed for Cough.  Dispense: 45 Capsule; Refill: 0         Follow up in one week if no improvement

## 2024-05-30 ENCOUNTER — OFFICE VISIT (OUTPATIENT)
Dept: URGENT CARE | Facility: PHYSICIAN GROUP | Age: 64
End: 2024-05-30
Payer: MEDICAID

## 2024-05-30 VITALS
RESPIRATION RATE: 16 BRPM | DIASTOLIC BLOOD PRESSURE: 80 MMHG | HEART RATE: 97 BPM | OXYGEN SATURATION: 96 % | WEIGHT: 165 LBS | TEMPERATURE: 97.3 F | SYSTOLIC BLOOD PRESSURE: 110 MMHG | HEIGHT: 61 IN | BODY MASS INDEX: 31.15 KG/M2

## 2024-05-30 DIAGNOSIS — J18.9 COMMUNITY ACQUIRED PNEUMONIA, UNSPECIFIED LATERALITY: ICD-10-CM

## 2024-05-30 DIAGNOSIS — R05.1 ACUTE COUGH: ICD-10-CM

## 2024-05-30 RX ORDER — DEXTROMETHORPHAN HYDROBROMIDE AND PROMETHAZINE HYDROCHLORIDE 15; 6.25 MG/5ML; MG/5ML
5 SYRUP ORAL EVERY 4 HOURS PRN
Qty: 120 ML | Refills: 0 | Status: SHIPPED | OUTPATIENT
Start: 2024-05-30

## 2024-05-30 ASSESSMENT — ENCOUNTER SYMPTOMS
GASTROINTESTINAL NEGATIVE: 1
WHEEZING: 1
CARDIOVASCULAR NEGATIVE: 1
EYES NEGATIVE: 1
SPUTUM PRODUCTION: 1
CONSTITUTIONAL NEGATIVE: 1
COUGH: 1

## 2024-05-30 NOTE — PROGRESS NOTES
"Subjective:   Adrianna Khan is a 63 y.o. female who presents for Cough (Ongoing cough)      Cough  Associated symptoms include wheezing.       Review of Systems   Constitutional: Negative.    HENT: Negative.     Eyes: Negative.    Respiratory:  Positive for cough, sputum production and wheezing.    Cardiovascular: Negative.    Gastrointestinal: Negative.    Genitourinary: Negative.    Skin: Negative.        Medications, Allergies, and current problem list reviewed today in Epic.     Objective:     /80   Pulse 97   Temp 36.3 °C (97.3 °F) (Temporal)   Resp 16   Ht 1.549 m (5' 1\")   Wt 74.8 kg (165 lb)   SpO2 96%     Physical Exam  Vitals reviewed.   HENT:      Right Ear: Tympanic membrane normal.      Left Ear: Tympanic membrane normal.      Nose: Nose normal.      Mouth/Throat:      Pharynx: Oropharynx is clear.   Neck:      Vascular: No carotid bruit.   Cardiovascular:      Rate and Rhythm: Normal rate and regular rhythm.      Pulses: Normal pulses.      Heart sounds: Normal heart sounds.   Pulmonary:      Breath sounds: Wheezing and rhonchi present.   Abdominal:      General: Abdomen is flat. Bowel sounds are normal.      Palpations: Abdomen is soft.   Lymphadenopathy:      Cervical: No cervical adenopathy.   Neurological:      Mental Status: She is alert.         Assessment/Plan:     Diagnosis and associated orders:     1. Acute cough  promethazine-dextromethorphan (PROMETHAZINE-DM) 6.25-15 MG/5ML syrup      2. Community acquired pneumonia, unspecified laterality           Comments/MDM:              Differential diagnosis, natural history, supportive care, and indications for immediate follow-up discussed.    Advised the patient to follow-up with the primary care physician for recheck, reevaluation, and consideration of further management.    Please note that this dictation was created using voice recognition software. I have made a reasonable attempt to correct obvious errors, but I expect that " there are errors of grammar and possibly content that I did not discover before finalizing the note.    This note was electronically signed by Ronny Diaz M.D.

## 2024-09-28 ENCOUNTER — OFFICE VISIT (OUTPATIENT)
Dept: URGENT CARE | Facility: PHYSICIAN GROUP | Age: 64
End: 2024-09-28
Payer: MEDICAID

## 2024-09-28 VITALS
SYSTOLIC BLOOD PRESSURE: 118 MMHG | BODY MASS INDEX: 31.91 KG/M2 | WEIGHT: 169 LBS | TEMPERATURE: 97.5 F | RESPIRATION RATE: 18 BRPM | HEIGHT: 61 IN | DIASTOLIC BLOOD PRESSURE: 64 MMHG | HEART RATE: 102 BPM | OXYGEN SATURATION: 93 %

## 2024-09-28 DIAGNOSIS — R05.1 ACUTE COUGH: ICD-10-CM

## 2024-09-28 DIAGNOSIS — J45.31 MILD PERSISTENT ASTHMA WITH EXACERBATION: ICD-10-CM

## 2024-09-28 DIAGNOSIS — B96.89 ACUTE BACTERIAL SINUSITIS: ICD-10-CM

## 2024-09-28 DIAGNOSIS — J01.90 ACUTE BACTERIAL SINUSITIS: ICD-10-CM

## 2024-09-28 RX ORDER — DOXYCYCLINE HYCLATE 100 MG
TABLET ORAL
Qty: 20 TABLET | Refills: 0 | Status: SHIPPED | OUTPATIENT
Start: 2024-09-28 | End: 2024-10-08

## 2024-09-28 RX ORDER — ALBUTEROL SULFATE 90 UG/1
INHALANT RESPIRATORY (INHALATION)
Qty: 8.5 G | Refills: 2 | Status: SHIPPED | OUTPATIENT
Start: 2024-09-28

## 2024-09-28 RX ORDER — DEXTROMETHORPHAN HYDROBROMIDE AND PROMETHAZINE HYDROCHLORIDE 15; 6.25 MG/5ML; MG/5ML
5 SYRUP ORAL EVERY 6 HOURS PRN
Qty: 140 ML | Refills: 0 | Status: SHIPPED | OUTPATIENT
Start: 2024-09-28 | End: 2024-10-05

## 2024-09-28 NOTE — PROGRESS NOTES
"Chief Complaint:    Chief Complaint   Patient presents with    Fever    Cough    Shortness of Breath     Symptoms x3 days       History of Present Illness:    Symptoms x 3 days, including fever up to 102 F, nasal symptoms, sneezing, sore throat, and cough. Daughter and daughter's kids had to go to ER this past week to get Rx cough meds. Patient tested herself for Covid and was negative. Daughter and daughter's kids were all negative on testing in ER. Patient is requesting Rx cough syrup as Tessalon did not help in the past, but Promethazine DM did help. She feels her Asthma is flaring up, but she is out of her Albuterol inhaler and is requesting refill of inhaler. Doxycycline worked and was tolerated when she had pneumonia in May 2024.      Past Medical History:    Past Medical History:   Diagnosis Date    Anxiety 2/13/2014    Xanax/ativan prn, switches off    Arthritis     Neck, hands, low back, knees    Asthma     inhalers daily     Breath shortness     \"at times\"    Cancer (HCC) 1988    Uterine CA- hysterectomy    Chronic low back pain 2/13/2014    Due to working with horses Hc/apap     Chronic neck pain 2/13/2014    Years hc/apap    Depression 2/13/2014    cymbalta     Dyslipidemia 9/19/2014    Has tricor, hasn't started it - afraid it would interfere with her thyroid meds Will check tft then start tricor - rpeta tft x 3 months    Fibromyalgia 2/13/2014    dx'd around 2005 cymbalta    Graves disease     Heart burn     Hiatus hernia syndrome     Surgically repaired    History of positive PPD     Hypothyroid 2/13/2014    meds since around 2000    Insomnia 2/13/2014    Amitriptyline - helps but wt gain Xanax/ativan for sleep too as needed    Left knee pain 9/19/2014    Already has an appt with dr alston in [place] at the end of hte month - just needs a referral    Pneumonia 2014     Past Surgical History:    Past Surgical History:   Procedure Laterality Date    OTHER  05/2024    Spinal fracture surgery    " BLEPHAROPLASTY Bilateral 02/09/2018    Procedure: BLEPHAROPLASTY - LOWER;  Surgeon: Kenji Joiner M.D.;  Location: SURGERY [facility];  Service: Plastics    MAMMOPLASTY AUGMENTATION Bilateral 02/09/2018    Procedure: MAMMOPLASTY AUGMENTATION;  Surgeon: Kenji Joiner M.D.;  Location: SURGERY [facility];  Service: Plastics    MASTOPEXY Bilateral 12/01/2017    Procedure: MASTOPEXY;  Surgeon: Kenji Joiner M.D.;  Location: SURGERY [facility];  Service: Plastics    KNEE ARTHROSCOPY Right 02/2016    KNEE ARTHROPLASTY TOTAL Left 11/17/2015    left total knee replacement 11/2015    HERNIA REPAIR  2008    GYN SURGERY  1991    ABDOMINAL HYSTERECTOMY TOTAL  1988    total    CHOLECYSTECTOMY  1987    open    EXPLORATORY LAPAROTOMY  1981    tubal pregnancy    APPENDECTOMY  1980     Social History:    Social History     Socioeconomic History    Marital status: Single     Spouse name: Not on file    Number of children: Not on file    Years of education: Not on file    Highest education level: Not on file   Occupational History    Not on file   Tobacco Use    Smoking status: Every Day     Current packs/day: 0.50     Average packs/day: 0.5 packs/day for 40.0 years (20.0 ttl pk-yrs)     Types: Cigarettes    Smokeless tobacco: Never   Vaping Use    Vaping status: Never Used   Substance and Sexual Activity    Alcohol use: No    Drug use: Yes     Types: Oral, Marijuana     Comment: denies h/o heroin, cocaine, meth, IVDU, Denies current use (uses oral medical marijuana)    Sexual activity: Not Currently     Comment:  around 2010   Other Topics Concern    Not on file   Social History Narrative    Not on file     Social Determinants of Health     Financial Resource Strain: Not on file   Food Insecurity: Not on file   Transportation Needs: Not on file   Physical Activity: Not on file   Stress: Not on file   Social Connections: Not on file   Intimate Partner Violence: Not on file   Housing Stability: Not  "on file     Family History:    Family History   Problem Relation Age of Onset    Cancer Maternal Aunt         breast    Diabetes Neg Hx     Heart Disease Neg Hx     Stroke Neg Hx      Medications:    Current Outpatient Medications on File Prior to Visit   Medication Sig Dispense Refill    levothyroxine (SYNTHROID) 200 MCG Tab TAKE 1 TABLET BY MOUTH EVERY DAY BEFORE MEALS      tizanidine (ZANAFLEX) 4 MG Tab TAKE 1 TABLET BY MOUTH EVERY DAY AS NEEDED FOR SPASTIC PAIN      aspirin 81 MG EC tablet TAKE 1 TABLET BY MOUTH EVERY DAY FOR 90 DAYS      HYDROcodone/acetaminophen (NORCO)  MG Tab hydrocodone 10 mg-acetaminophen 325 mg tablet   Take 1 tablet every 6 hours by oral route as needed for 30 days.      BD PEN NEEDLE ADELFO U/F        No current facility-administered medications on file prior to visit.     Allergies:    Allergies   Allergen Reactions    Nubain [Nalbuphine] Itching     \"severe itching\"    Iodine      Blisters, topical \"no issues with IV contrast\"    Methadone Hcl Vomiting    Pcn [Penicillins] Rash     .    Prednisone Rash     .       Vitals:    Vitals:    09/28/24 1353   BP: 118/64   Pulse: (!) 102   Resp: 18   Temp: 36.4 °C (97.5 °F)   TempSrc: Temporal   SpO2: 93%   Weight: 76.7 kg (169 lb)   Height: 1.549 m (5' 1\")       Physical Exam:    Constitutional: Vital signs reviewed. Appears well-developed and well-nourished. Occl cough. No acute distress.   Eyes: Sclera white, conjunctivae clear.   ENT: Very TTP all sinus regions bilaterally. External ears normal. External auditory canals normal without discharge. TMs translucent and non-bulging. Hearing normal. Lips/teeth are normal. Oral mucosa pink and moist. Posterior pharynx: WNL.  Neck: Neck supple.   Cardiovascular: Regular rate and rhythm. No murmur.  Pulmonary/Chest: Respirations non-labored. Clear to auscultation bilaterally.  Musculoskeletal: Normal gait. No muscular atrophy or weakness.  Neurological: Alert and oriented to person, place, and " time. Muscle tone normal. Coordination normal.   Skin: No rashes or lesions. Warm, dry, normal turgor.  Psychiatric: Normal mood and affect. Behavior is normal. Judgment and thought content normal.       Assessment / Plan & Medical Decision Makin. Acute bacterial sinusitis  - doxycycline (VIBRAMYCIN) 100 MG Tab; 1 tab by mouth twice a day x 10 days.  Dispense: 20 Tablet; Refill: 0    2. Mild persistent asthma with exacerbation  - albuterol 108 (90 Base) MCG/ACT Aero Soln inhalation aerosol; 2 puffs every 4 hours only if needed for cough, wheezing, shortness of breath, or chest tightness.  Dispense: 8.5 g; Refill: 2    3. Acute cough  - promethazine-dextromethorphan (PROMETHAZINE-DM) 6.25-15 MG/5ML syrup; Take 5 mL by mouth every 6 hours as needed for Cough for up to 7 days. May cause drowsiness.  Dispense: 140 mL; Refill: 0       Discussed with her DDX, management options, and risks, benefits, and alternatives to treatment plan agreed upon.    Symptoms x 3 days, including fever up to 102 F, nasal symptoms, sneezing, sore throat, and cough. Daughter and daughter's kids had to go to ER this past week to get Rx cough meds. Patient tested herself for Covid and was negative. Daughter and daughter's kids were all negative on testing in ER. Patient is requesting Rx cough syrup as Tessalon did not help in the past, but Promethazine DM did help. She feels her Asthma is flaring up, but she is out of her Albuterol inhaler and is requesting refill of inhaler. Doxycycline worked and was tolerated when she had pneumonia in May 2024.    Occl cough. Very TTP all sinus regions bilaterally.     Asthma - chronic condition with acute exacerbation.     Agreeable to medications prescribed.    Discussed expected course of duration, time for improvement, and to seek follow-up in Emergency Room, urgent care, or with PCP if getting worse at any time or not improving within expected time frame.

## 2025-02-10 ENCOUNTER — OFFICE VISIT (OUTPATIENT)
Dept: URGENT CARE | Facility: PHYSICIAN GROUP | Age: 65
End: 2025-02-10
Payer: MEDICAID

## 2025-02-10 VITALS
DIASTOLIC BLOOD PRESSURE: 78 MMHG | BODY MASS INDEX: 33.92 KG/M2 | SYSTOLIC BLOOD PRESSURE: 124 MMHG | RESPIRATION RATE: 16 BRPM | WEIGHT: 179.5 LBS | OXYGEN SATURATION: 98 % | HEART RATE: 100 BPM | TEMPERATURE: 97.9 F

## 2025-02-10 DIAGNOSIS — E03.9 HYPOTHYROIDISM, UNSPECIFIED TYPE: ICD-10-CM

## 2025-02-10 DIAGNOSIS — F17.210 TOBACCO DEPENDENCE DUE TO CIGARETTES: ICD-10-CM

## 2025-02-10 DIAGNOSIS — J20.8 VIRAL BRONCHITIS: ICD-10-CM

## 2025-02-10 PROCEDURE — 99406 BEHAV CHNG SMOKING 3-10 MIN: CPT | Performed by: NURSE PRACTITIONER

## 2025-02-10 PROCEDURE — 3074F SYST BP LT 130 MM HG: CPT | Performed by: NURSE PRACTITIONER

## 2025-02-10 PROCEDURE — 3078F DIAST BP <80 MM HG: CPT | Performed by: NURSE PRACTITIONER

## 2025-02-10 PROCEDURE — 99213 OFFICE O/P EST LOW 20 MIN: CPT | Mod: 25 | Performed by: NURSE PRACTITIONER

## 2025-02-10 RX ORDER — ALBUTEROL SULFATE 90 UG/1
INHALANT RESPIRATORY (INHALATION)
Qty: 8.5 G | Refills: 2 | Status: SHIPPED | OUTPATIENT
Start: 2025-02-10

## 2025-02-10 RX ORDER — LEVOTHYROXINE SODIUM 200 UG/1
200 TABLET ORAL
Qty: 90 TABLET | Refills: 0 | Status: SHIPPED | OUTPATIENT
Start: 2025-02-10 | End: 2025-05-11

## 2025-02-11 NOTE — PROGRESS NOTES
Subjective:   Adrianna Khan is a 64 y.o. female who presents for Shortness of Breath (Since yesterday, shortness of breathe, chest tightness, cough, fatigue)    1) hypothyroidism: Patient reports long history of hypothyroidism.  Currently in between primary care providers and nurse requesting levothyroxine 20 mcg once daily to be refilled.  Patient was seen approximately a year ago in clinic and did have labs completed indicating normal TSH level.  Patient states that she is currently been out of this medication for 6 days.  She denies any hyper or hypothyroid symptoms.  Will go ahead and give refill for 90 days, patient was urged to get in with primary care for refills.    2) cough shortness of breath: Patient reports 2 to 3-day history of worsening cough, shortness of breath, chest tightness, headache, and fatigue.  Patient has also had associated symptoms of hot and cold chills.  Patient denies any chest pain, palpitations, fevers, nausea, vomiting, or diarrhea.  Patient is a chronic smoker.  Patient denies history of COPD or asthma.    Medications, Allergies, and current problem list reviewed today in Epic.     Objective:     /78   Pulse 100   Temp 36.6 °C (97.9 °F) (Temporal)   Resp 16   Wt 81.4 kg (179 lb 8 oz)   SpO2 98%     Physical Exam  Vitals reviewed.   Constitutional:       General: She is not in acute distress.     Appearance: Normal appearance. She is not ill-appearing or toxic-appearing.   HENT:      Head: Normocephalic.      Nose: Nose normal. No congestion or rhinorrhea.      Mouth/Throat:      Mouth: Mucous membranes are moist.      Pharynx: Oropharynx is clear. No posterior oropharyngeal erythema.   Eyes:      Extraocular Movements: Extraocular movements intact.      Conjunctiva/sclera: Conjunctivae normal.      Pupils: Pupils are equal, round, and reactive to light.   Neck:      Thyroid: No thyroid mass, thyromegaly or thyroid tenderness.   Cardiovascular:      Rate and Rhythm:  Normal rate.   Pulmonary:      Effort: Pulmonary effort is normal. No respiratory distress.      Breath sounds: No stridor. Rhonchi present. No wheezing or rales.      Comments: Bronchial cough.  Rhonchi heard in left lower lung field, does clear with cough.  Chest:      Chest wall: No tenderness.   Musculoskeletal:         General: Normal range of motion.      Cervical back: Normal range of motion and neck supple. No rigidity or tenderness.   Lymphadenopathy:      Cervical: No cervical adenopathy.   Skin:     General: Skin is warm and dry.   Neurological:      Mental Status: She is alert and oriented to person, place, and time.   Psychiatric:         Mood and Affect: Mood normal.         Behavior: Behavior normal.         Thought Content: Thought content normal.         Judgment: Judgment normal.         Assessment/Plan:     Diagnosis and associated orders:     1. Viral bronchitis  albuterol 108 (90 Base) MCG/ACT Aero Soln inhalation aerosol      2. Hypothyroidism, unspecified type  levothyroxine (SYNTHROID) 200 MCG Tab    Referral to establish with PCP      3. Tobacco dependence due to cigarettes           Comments/MDM:     1. Viral bronchitis  This is an acute condition.  Patient is nontoxic-appearing in no acute distress.  Patient does not appear dehydrated.  Patient is speaking full sentences without any increased respiratory rate or effort.  Patient does have bronchial cough heard throughout exam.  She did have rhonchi and left lower lung field, does clear with cough.  No signs of secondary bacterial infection.  Vital signs within normal range.  Discussed with patient due to chronic smoking I believe she may have underlying COPD however recommended that she follow-up and establish with primary care.  Did discuss and offer viral testing however patient declined at this time she is not interested in antiviral medications.  Will go ahead and prescribe albuterol, side effects medication discussed.  Stressed with  patient the importance of pushing fluids, rest, and may take over-the-counter guaifenesin to help as an expectorant.  Patient was highly encouraged to quit smoking.  Strict ER precautions were given.  - albuterol 108 (90 Base) MCG/ACT Aero Soln inhalation aerosol; 2 puffs every 4 hours only if needed for cough, wheezing, shortness of breath, or chest tightness.  Dispense: 8.5 g; Refill: 2    2. Hypothyroidism, unspecified type  Chronic condition.  Labs just over a year old, normal TSH level.  Will go ahead and give 90-day refill and put in referral to staff with primary care.  - levothyroxine (SYNTHROID) 200 MCG Tab; Take 1 Tablet by mouth every morning on an empty stomach for 90 days.  Dispense: 90 Tablet; Refill: 0  - Referral to establish with PCP    3. Tobacco dependence due to cigarettes  Chronic and ongoing health concern.  Discussed with patient for 4 minutes during the exam today about smoking sensation and risks associated with ongoing tobacco use.  Patient was highly encouraged to quit smoking.    Patient was involved with shared decision-making throughout the exam today and verbalizes understanding regards to plan of care, discharge instructions, and follow-up         Differential diagnosis, natural history, supportive care, and indications for immediate follow-up discussed.    Advised the patient to follow-up with the primary care physician for recheck, reevaluation, and consideration of further management.    I personally reviewed prior external notes and test results pertinent to today's visit as well as additional imaging and testing completed in clinic today.     Please note that this dictation was created using voice recognition software. I have made a reasonable attempt to correct obvious errors, but I expect that there are errors of grammar and possibly content that I did not discover before finalizing the note.

## 2025-05-08 DIAGNOSIS — E03.9 HYPOTHYROIDISM, UNSPECIFIED TYPE: ICD-10-CM

## 2025-05-08 RX ORDER — LEVOTHYROXINE SODIUM 200 UG/1
200 TABLET ORAL
Qty: 90 TABLET | Refills: 0 | Status: SHIPPED | OUTPATIENT
Start: 2025-05-08

## 2025-08-07 ENCOUNTER — TELEPHONE (OUTPATIENT)
Dept: HEALTH INFORMATION MANAGEMENT | Facility: OTHER | Age: 65
End: 2025-08-07
Payer: MEDICAID

## (undated) DEVICE — BOVIE NEEDLE TIP 3CM COLORADO

## (undated) DEVICE — ARMREST CRADLE FOAM - (2PR/PK 12PR/CA)

## (undated) DEVICE — STAPLER SKIN DISP - (6/BX 10BX/CA) VISISTAT

## (undated) DEVICE — SUCTION INSTRUMENT YANKAUER BULBOUS TIP W/O VENT (50EA/CA)

## (undated) DEVICE — LACTATED RINGERS INJ 1000 ML - (14EA/CA 60CA/PF)

## (undated) DEVICE — GLOVE BIOGEL PI ULTRATOUCH SZ 7.0 SURGICAL PF LF- POWDER FREE (50/BX 4BX/CA)

## (undated) DEVICE — Device

## (undated) DEVICE — BAG, SPONGE COUNT 50600

## (undated) DEVICE — SPONGE GAUZESTER 4 X 4 4PLY - (128PK/CA)

## (undated) DEVICE — BANDAGE ELASTIC DOUBLE 6X10 - (6EA/BX)"

## (undated) DEVICE — GOWN SURGEONS X-LARGE - DISP. (30/CA)

## (undated) DEVICE — KIT ANESTHESIA W/CIRCUIT & 3/LT BAG W/FILTER (20EA/CA)

## (undated) DEVICE — SUTURE GENERAL

## (undated) DEVICE — GLOVE BIOGEL PI ULTRATOUCH SZ 7.5 SURGICAL PF LF -(50/BX 4BX/CA)

## (undated) DEVICE — SUTURE 2-0 VICRYL PLUS CT-2 - 27 INCH (36/BX)

## (undated) DEVICE — BLANKET UNDERBODY FULL ACCES - (5/CA)

## (undated) DEVICE — PACK BREAST SM OR - (1EA/CA)

## (undated) DEVICE — KIT ROOM DECONTAMINATION

## (undated) DEVICE — SPANDAGE SZ 6 ELASTIC NET - (25YD/CA)

## (undated) DEVICE — HEAD HOLDER JUNIOR/ADULT

## (undated) DEVICE — SUTURE 3-0 30CM X 30CM STRATAFIX SPIRAL PGA/PLC CLR FS NEEDLE (12/BX)

## (undated) DEVICE — APPLICATOR COTTONTIP 3 IN - STERILE (10EA/PK 100PK/CA)

## (undated) DEVICE — GLOVE BIOGEL SZ 7 SURGICAL PF LTX - (50PR/BX 4BX/CA)

## (undated) DEVICE — TUBE CONNECTING SUCTION - CLEAR PLASTIC STERILE 72 IN (50EA/CA)

## (undated) DEVICE — MASK ANESTHESIA ADULT  - (100/CA)

## (undated) DEVICE — CANISTER SUCTION RIGID RED 1500CC (40EA/CA)

## (undated) DEVICE — SET MULTI-ADD FLUID TRANSFER 42 INCH - (50/CA)THIS IS A CUSTOM MADE ITEM 4 TO 6 WEEK LEAD TIME

## (undated) DEVICE — SUTURE 4-0 30CM X 30CM STRATAFIX SPRIAL PGA/PCL CLR FS-2 NEEDLE (12/BX)

## (undated) DEVICE — GLOVE, LITE (PAIR)

## (undated) DEVICE — TRAY SKIN SCRUB PVP WET (20EA/CA) PART #DYND70356 DISCONTINUED

## (undated) DEVICE — NEPTUNE 4 PORT MANIFOLD - (20/PK)

## (undated) DEVICE — GLOVE BIOGEL SZ 8.5 SURGICAL PF LTX - (50PR/BX 4BX/CA)

## (undated) DEVICE — BLADE SURGICAL #15 - (50/BX 3BX/CA)

## (undated) DEVICE — DRAPE SURGICAL U 77X120 - (10/CA)

## (undated) DEVICE — BANDAGE ROLL STERILE BULKEE 4.5 IN X 4 YD (100EA/CA)

## (undated) DEVICE — TOWELS CLOTH SURGICAL - (4/PK 20PK/CA)

## (undated) DEVICE — PACK MINOR BASIN - (2EA/CA)

## (undated) DEVICE — WATER IRRIGATION STERILE 1000ML (12EA/CA)

## (undated) DEVICE — ELECTRODE 850 FOAM ADHESIVE - HYDROGEL RADIOTRNSPRNT (50/PK)

## (undated) DEVICE — GOWN SURGICAL XX-LARGE - (28EA/CA) SIRUS NON REINFORCED

## (undated) DEVICE — SODIUM CHL IRRIGATION 0.9% 1000ML (12EA/CA)

## (undated) DEVICE — SUTURE 3-0 VICRYL PLUSPS-2 - 18 INCH (36/BX)

## (undated) DEVICE — SOD. CHL. INJ. 0.9% 1000 ML - (14EA/CA 60CA/PF)

## (undated) DEVICE — ELECTRODE DUAL RETURN W/ CORD - (50/PK)

## (undated) DEVICE — GOWN WARMING STANDARD FLEX - (30/CA)

## (undated) DEVICE — CLOSURE WOUND 1/4 X 4 (STERI - STRIP) (50/BX 4BX/CA)

## (undated) DEVICE — BOVIE BLADE COATED &INSULATED (50EA/PK)

## (undated) DEVICE — DRAPE MAGNETIC (INSTRA-MAG) - (30/CA)

## (undated) DEVICE — HUMID-VENT HEAT AND MOISTURE EXCHANGE- (50/BX)

## (undated) DEVICE — SUTURE 6-0 PROLENE P-3 - (12/BX)

## (undated) DEVICE — PEN SKIN MARKER W/RULER - (50EA/BX)

## (undated) DEVICE — PAD EYE GAUZE COVERED OVAL 1 5/8 X 2 5/8" STERILE"

## (undated) DEVICE — DRAPE LARGE 3 QUARTER - (20/CA)

## (undated) DEVICE — PROTECTOR ULNA NERVE - (36PR/CA)

## (undated) DEVICE — SENSOR SPO2 NEO LNCS ADHESIVE (20/BX) SEE USER NOTES

## (undated) DEVICE — MASK AIRWAY SIZE 3 UNIQUE SILICON (10/BX)

## (undated) DEVICE — TUBE E-T HI-LO CUFF 7.0MM (10EA/PK)

## (undated) DEVICE — ADHESIVE MASTISOL - (48/BX)

## (undated) DEVICE — GLOVE BIOGEL PI ORTHO SZ 7 PF LF (40PR/BX)

## (undated) DEVICE — SUTURE 6-0 SILK  P-1 (12PK/BX)

## (undated) DEVICE — SPONGE XRAY 8X4 STERL. 12PL - (10EA/TY 80TY/CA)